# Patient Record
Sex: FEMALE | Race: WHITE | NOT HISPANIC OR LATINO | Employment: UNEMPLOYED | URBAN - METROPOLITAN AREA
[De-identification: names, ages, dates, MRNs, and addresses within clinical notes are randomized per-mention and may not be internally consistent; named-entity substitution may affect disease eponyms.]

---

## 2024-02-14 ENCOUNTER — HOSPITAL ENCOUNTER (OUTPATIENT)
Dept: RADIOLOGY | Facility: HOSPITAL | Age: 64
Discharge: HOME | End: 2024-02-14
Payer: MEDICARE

## 2024-02-14 DIAGNOSIS — I72.5 ANEURYSM OF BASILAR ARTERY (HCC): ICD-10-CM

## 2024-02-14 PROCEDURE — G1004 CDSM NDSC: HCPCS

## 2024-02-14 PROCEDURE — 70496 CT ANGIOGRAPHY HEAD: CPT

## 2024-02-14 RX ADMIN — IOHEXOL 85 ML: 350 INJECTION, SOLUTION INTRAVENOUS at 11:22

## 2024-02-17 DIAGNOSIS — E55.9 VITAMIN D DEFICIENCY: ICD-10-CM

## 2024-02-19 ENCOUNTER — OFFICE VISIT (OUTPATIENT)
Dept: NEUROSURGERY | Facility: CLINIC | Age: 64
End: 2024-02-19
Payer: MEDICARE

## 2024-02-19 VITALS
WEIGHT: 125.6 LBS | TEMPERATURE: 97.9 F | HEIGHT: 64 IN | BODY MASS INDEX: 21.44 KG/M2 | DIASTOLIC BLOOD PRESSURE: 80 MMHG | RESPIRATION RATE: 20 BRPM | OXYGEN SATURATION: 100 % | SYSTOLIC BLOOD PRESSURE: 140 MMHG | HEART RATE: 70 BPM

## 2024-02-19 DIAGNOSIS — I72.5 ANEURYSM OF BASILAR ARTERY (HCC): Primary | ICD-10-CM

## 2024-02-19 DIAGNOSIS — Z01.812 PRE-PROCEDURE LAB EXAM: ICD-10-CM

## 2024-02-19 PROCEDURE — 99214 OFFICE O/P EST MOD 30 MIN: CPT | Performed by: NURSE PRACTITIONER

## 2024-02-19 NOTE — PROGRESS NOTES
"Assessment/Plan:    Aneurysm of basilar artery (HCC)  Identified risk factors for aneurysm growth and rupture   HTN B/P 140/80 , RX Norvasc , lisinopril--reports the bottle was empty so she stopped after that advised to verify.  HLD --HO , treatment with Crestor.Daughter interjects non compliance w/ diet eat salt and fatty foods.   Tobacco dependence  ---  She also reports having difficulty quitting smoking , adverse effect to nicotine products, she has failed multiple attempts to stop smoking. Reports a history of anxiety that is relieved with smoking..   Need for PCP over site of co- morbid risk conditions ever 3-6 months or as otherwise specified , no appointment since 2023. Advised to call and schedule f/u appointment she has multiple complaints not related to aneurysm. Continues with mild rining in her ears but not like previously .   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  Extensively discussed natural nature of aneurysms.    Discussed risk of rupture is less than 1%/year per UCAS and <1%/5yrs per ISIUA.    Discussed modifiable risk factors including hypertension, hyperlipidemia, and smoking.    Discussed family history, as above.   Discussed signs and symptoms of aneurysm rupture including severe, sudden onset headache, neck pain, nausea and vomiting, and seizure.  Reiterated that the symptoms should prompt patient to visit in emergency department immediately.    Explained If you experience ANY of the following, call 911 or our office IMMEDIATELY:  SUDDEN severe headache (\"worst headache of my life\" WHOL)  Seizures  Nausea or vomiting  Vision or " speech disturbances  Neck pain   Patient will follow-up prn or if symptoms worsen.      Patient will return for follow-up visit with completed imaging CTA brain w/wo  in 1 year due  2/14/2025 , appointment 3 days -1 week post as raeann w/ Sunday and JUAN .   Of note as per discussion with Dr. Brand if imaging in 2025 without significant change we will increase interval surveillance imaging to every 2 years.  Advised if he/she has additional questions or concerns to please contact the neurosurgery office  [employer] teaching .           Subjective: 1 year surveillance visit for intracranial aneurysm, with imaging for review and reassessment.     --# [id number]    Patient ID: Ingrid Sanches is a 63 y.o. female hypertension, type 2 diabetes, tobacco abuse,   Leukocytosis,  HPI   Incidental finding of intracranial aneurysm during workup by PCP for pulsatile tinnitus in November 2021.  Was referred to ENT. An MRA  head was ordered by Dr. Alonso which demonstrated a basilar tip aneurysm.  MRA of the head completed which demonstrated aneurysm.    CTA head w/wo, 2/4/2022: CT angiography confirms tiny tip of the basilar aneurysm.  Recommend follow-up CT or MR angiography in 6-12 months.  MRA head wo, 12/30/2021: Focal outpouching arising from the superior aspect of the basilar tip suggestive of a 1.5 mm aneurysm.  CT angiography recommended as well as consultation with [facility], a [facility] of [facility] at [phone].    Initial neurosurgery visit  2/7/2022, DR. Brand as per provider documentation  Unclear if this is a true aneurysm and suspect is likely a  infundibulum. However this cannot be distinguished on noninvasive imaging. Nevertheless, if a true aneurysm the risk of rupture is quite low, and well below 1% per year.         Social   Works as a house keeper     REVIEW OF SYSTEMS  Review of Systems   Constitutional:  Positive for fatigue.   HENT:   Positive for congestion (due to recent flu), nosebleeds and tinnitus (currently still experiencing ringing in ears only while laying down, stated at last visit, was improving - but better now; lasted almost 2 months, went away 10-12 days for the most part). Negative for trouble swallowing.    Eyes:  Positive for visual disturbance (blurry).   Respiratory: Negative.     Cardiovascular: Negative.    Gastrointestinal:  Positive for constipation (due to iron supplement).   Endocrine: Negative.    Genitourinary: Negative.    Musculoskeletal:  Positive for gait problem (lost balance sometimes).   Skin: Negative.    Allergic/Immunologic: Negative.    Neurological:  Positive for weakness (left arm weakness trouble with  in left hand). Negative for tremors, seizures and speech difficulty.   Hematological: Negative.    Psychiatric/Behavioral:  Positive for confusion (sometimes frogetfull) and sleep disturbance (due to overstimulation and overthinking).          Meds/Allergies     Current Outpatient Medications   Medication Sig Dispense Refill    acetaminophen (TYLENOL) 500 mg tablet Take 500 mg by mouth every 6 (six) hours as needed for mild pain      amLODIPine (NORVASC) 5 mg tablet TAKE ONE TABLET BY MOUTH DAILY 90 tablet 3    cholecalciferol (VITAMIN D3) 1,000 units tablet Take 2 tablets (2,000 Units total) by mouth daily 90 tablet 3    docusate sodium (COLACE) 100 mg capsule TAKE ONE CAPSULE BY MOUTH TWICE DAILY 60 capsule 3    lisinopril (ZESTRIL) 20 mg tablet TAKE ONE TABLET BY MOUTH DAILY AS DIRECTED 90 tablet 3    naproxen (Naprosyn) 500 mg tablet Take one tablet twice daily as needed with food for pain 60 tablet 0    rosuvastatin (CRESTOR) 40 MG tablet TAKE ONE TABLET BY MOUTH DAILY 90 tablet 3    urea (CARMOL) 40 % Apply topically daily Apply topically daily to affected areas on bilateral plantar foot. 10 g 3    ammonium lactate (LAC-HYDRIN) 12 % cream Apply topically as needed for dry skin (Patient not taking:  Reported on 2024) 385 g 0     No current facility-administered medications for this visit.       Allergies   Allergen Reactions    Nicoderm [Nicotine] Palpitations, Other (See Comments) and Chest Pain     Nicotine gum - nose bleeds       PAST HISTORY    Past Medical History:   Diagnosis Date    Diabetes mellitus (HCC)     Hyperlipidemia     Hypertension        Past Surgical History:   Procedure Laterality Date    BREAST BIOPSY Left 2021    stereo    BREAST EXCISIONAL BIOPSY Left     8 yrs ago benign in [place]    BREAST SURGERY  2016    removal of cyst      SECTION      COLONOSCOPY  2021    MAMMO STEREOTACTIC BREAST BIOPSY LEFT (ALL INC) Left 2021    TUBAL LIGATION      US GUIDED BREAST BIOPSY RIGHT COMPLETE Right 2021       Social History     Tobacco Use    Smoking status: Every Day     Current packs/day: 0.00     Types: Cigarettes     Last attempt to quit: 1981     Years since quittin.1    Smokeless tobacco: Never    Tobacco comments:     Smokes about 4 cigarettes a day    Vaping Use    Vaping status: Never Used   Substance Use Topics    Alcohol use: Not Currently     Comment: social    Drug use: Never       Family History   Problem Relation Age of Onset    Diabetes Mother     Diabetes Sister     Diabetes Sister     Diabetes Sister     No Known Problems Sister     Endometrial cancer Sister 58    No Known Problems Daughter     No Known Problems Maternal Grandmother     No Known Problems Maternal Grandfather     Diabetes Paternal Grandmother     Cancer Paternal Grandmother     Endometrial cancer Maternal Aunt         unsure of age    Cancer Maternal Aunt     No Known Problems Maternal Aunt     No Known Problems Maternal Aunt     No Known Problems Maternal Aunt     No Known Problems Maternal Aunt     No Known Problems Paternal Aunt     No Known Problems Paternal Aunt     No Known Problems Paternal Aunt     No Known Problems Paternal Aunt     No Known Problems Paternal  "Aunt     No Known Problems Paternal Aunt     No Known Problems Paternal Aunt     No Known Problems Son     No Known Problems Son        The following portions of the patient's history were reviewed and updated as appropriate: allergies, current medications, past family history, past medical history, past social history, past surgical history and problem list.      EXAM    Vitals:Blood pressure 140/80, pulse 70, temperature 97.9 °F (36.6 °C), temperature source Temporal, resp. rate 20, height 5' 4\" (1.626 m), weight 57 kg (125 lb 9.6 oz), SpO2 100%.,Body mass index is 21.56 kg/m².     Vitals:    02/19/24 1303   BP: 140/80   Pulse: 70   Resp: 20   Temp: 97.9 °F (36.6 °C)   SpO2: 100%      Physical Exam  Vitals and nursing note reviewed. Exam conducted with a chaperone present (daughter).   Constitutional:       General: She is not in acute distress.     Appearance: She is not ill-appearing, toxic-appearing or diaphoretic.   HENT:      Head: Normocephalic and atraumatic.   Eyes:      General: No scleral icterus.        Right eye: No discharge.         Left eye: No discharge.      Extraocular Movements: Extraocular movements intact.   Pulmonary:      Effort: Pulmonary effort is normal. No respiratory distress.   Musculoskeletal:      Right lower leg: No edema.      Left lower leg: No edema.   Neurological:      General: No focal deficit present.      Mental Status: She is alert and oriented to person, place, and time.      Motor: Motor strength is normal.     Gait: Gait is intact.   Psychiatric:         Mood and Affect: Mood normal.         Behavior: Behavior normal.         Neurologic Exam     Mental Status   Oriented to person, place, and time.     Cranial Nerves     CN III, IV, VI   Right pupil: Size: 2 mm. Shape: regular. Reactivity: brisk.   Left pupil: Shape: regular. Reactivity: brisk.   Nystagmus: none   Diplopia: none  Ophthalmoparesis: none    Motor Exam     Strength   Strength 5/5 throughout.     Sensory Exam "   Light touch normal.     Gait, Coordination, and Reflexes     Gait  Gait: normal      Imaging Studies  2/14/2024 CTA head w wo contrast    Result Date: 2/19/2024  Narrative: CTA BRAIN WITH CONTRAST INDICATION: I72.5: Aneurysm of other precerebral arteries 1. 1 year surveillance basilar tip aneurysm. COMPARISON:   2/8/2023 TECHNIQUE:   Post contrast imaging was performed after administration of iodinated contrast through the neck and brain. Post contrast axial 0.625 mm images timed to opacify the arterial system. 3D rendering was performed on an independent workstation.   MIP reconstructions performed. Coronal reconstructions were performed of the noncontrast portion of the brain. Radiation dose length product (DLP) for this visit:  1309.2 mGy-cm .  This examination, like all CT scans performed in the [facility], was performed utilizing techniques to minimize radiation dose exposure, including the use of iterative  reconstruction and automated exposure control. IV Contrast:  85 mL of iohexol (OMNIPAQUE) IMAGE QUALITY:   Diagnostic FINDINGS: NONCONTRAST BRAIN: PARENCHYMA: Decreased attenuation is noted in periventricular and subcortical white matter demonstrating an appearance that is statistically most likely to represent mild microangiopathic change; this appearance is similar when compared to most recent prior examination. No CT signs of acute infarction.  No intracranial mass, mass effect or midline shift.  No acute parenchymal hemorrhage. Small chronic appearing bilateral basal ganglia lacunar infarctions are stable. VENTRICLES AND EXTRA-AXIAL SPACES:  Normal for the patient's age. VISUALIZED ORBITS: Normal visualized orbits. PARANASAL SINUSES: Small amount of frothy secretions in the right sphenoid sinus noted. INTRACRANIAL VASCULATURE INTERNAL CAROTID ARTERIES: Atherosclerotic calcifications of the cavernous segment of the internal carotid artery are minimal. Normal ophthalmic artery origins.   Normal ICA terminus. ANTERIOR CIRCULATION:  Symmetric A1 segments and anterior cerebral arteries with normal enhancement.  Normal anterior communicating artery. MIDDLE CEREBRAL ARTERY CIRCULATION:  M1 segment and middle cerebral artery branches demonstrate normal enhancement bilaterally. DISTAL VERTEBRAL ARTERIES:  Normal distal vertebral arteries.  Posterior inferior cerebellar artery origins are normal. Normal vertebral basilar junction. BASILAR ARTERY: Again demonstrated is a small 2 mm superiorly oriented blister aneurysm arising from the tip of the basilar artery series 304, image 152, stable. Otherwise, basilar artery is normal in caliber.  Normal superior cerebellar arteries. POSTERIOR CEREBRAL ARTERIES: Both posterior cerebral arteries arises from the basilar tip.  Both arteries demonstrate normal enhancement.   Normal posterior communicating arteries. DURAL VENOUS SINUSES:  Normal. NON VASCULAR ANATOMY BONY STRUCTURES:  No acute osseous abnormality.     Impression: 1. 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 2. No acute intracranial hemorrhage, mass effect or edema. 3. Mild, chronic microangiopathy and chronic bilateral basal ganglia lacunar infarctions are stable. 4. Mild sphenoid sinusitis. Workstation performed: [id number]     Mammo screening bilateral w 3d & cad    Result Date: 2/7/2024  Narrative: DIAGNOSIS: Breast cancer screening by mammogram TECHNIQUE: Digital screening mammography was performed. Computer Aided Detection (CAD) analyzed all applicable images. COMPARISONS: Prior breast imaging dated: 08/09/2022, 09/09/2021, 03/09/2021, 02/12/2021, and 07/01/2017 RELEVANT HISTORY: Family Breast Cancer History: No known family history of breast cancer. Family Medical History: Family medical history includes endometrial cancer in 2 relatives (maternal aunt, sister). Personal History: No known relevant hormone history. Surgical history includes breast  biopsy. No known relevant medical history. The patient is scheduled in a reminder system for screening mammography. 8-10% of cancers will be missed on mammography. Management of a palpable abnormality must be based on clinical grounds.  Patients will be notified of their results via letter from our facility. Accredited by American College [employer] and FDA. RISK ASSESSMENT: 5 Year Tyrer-Cuzick: 0.33% 10 Year Tyrer-Cuzick: 0.61% Lifetime Tyrer-Cuzick: 1.32% TISSUE DENSITY: The breasts are almost entirely fatty. INDICATION: Ingrid Landry is a 63 y.o. female presenting for screening mammography. FINDINGS: Bilateral There are no suspicious masses, grouped microcalcifications or areas of unexplained architectural distortion. The skin and nipple areolar complex are unremarkable.  Biopsy clip noted.  Benign calcifications noted.     Impression: No mammographic evidence of malignancy. ASSESSMENT/BI-RADS CATEGORY: Left: 2 - Benign Right: 2 - Benign Overall: 2 - Benign RECOMMENDATION:      - Routine screening mammogram in 1 year for both breasts. Workstation ID: [id number]       I have personally reviewed pertinent reports.   and I have personally reviewed pertinent films in PACS    I have spent a total time of 35 minutes on 02/19/24 in caring for this patient including Diagnostic results, Risks and benefits of tx options, Instructions for management, Patient and family education, Importance of tx compliance, Risk factor reductions, Impressions, Counseling / Coordination of care, Documenting in the medical record, Reviewing / ordering tests, medicine, procedures  , Obtaining or reviewing history  , and Communicating with other healthcare professionals .

## 2024-02-19 NOTE — ASSESSMENT & PLAN NOTE
"Identified risk factors for aneurysm growth and rupture   HTN B/P 140/80 , RX Norvasc , lisinopril--reports the bottle was empty so she stopped after that advised to verify.  HLD --HO , treatment with Crestor.Daughter interjects non compliance w/ diet eat salt and fatty foods.   Tobacco dependence  ---  She also reports having difficulty quitting smoking , adverse effect to nicotine products, she has failed multiple attempts to stop smoking. Reports a history of anxiety that is relieved with smoking..   Need for PCP over site of co- morbid risk conditions ever 3-6 months or as otherwise specified , no appointment since 2023. Advised to call and schedule f/u appointment she has multiple complaints not related to aneurysm. Continues with mild rining in her ears but not like previously .   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  Extensively discussed natural nature of aneurysms.    Discussed risk of rupture is less than 1%/year per UCAS and <1%/5yrs per ISIUA.    Discussed modifiable risk factors including hypertension, hyperlipidemia, and smoking.    Discussed family history, as above.   Discussed signs and symptoms of aneurysm rupture including severe, sudden onset headache, neck pain, nausea and vomiting, and seizure.  Reiterated that the symptoms should prompt patient to visit in emergency department immediately.    Explained If you experience ANY of the following, call 911 or our office IMMEDIATELY:  SUDDEN severe headache (\"worst headache of my life\" WHOL)  Seizures  Nausea or vomiting  Vision or speech disturbances  Neck pain   Patient will " follow-up prn or if symptoms worsen.      Patient will return for follow-up visit with completed imaging CTA brain w/wo  in 1 year due  2/14/2025 , appointment 3 days -1 week post as raeann w/ Sunday and JUAN .   Of note as per discussion with Dr. Brand if imaging in 2025 without significant change we will increase interval surveillance imaging to every 2 years.  Advised if he/she has additional questions or concerns to please contact the neurosurgery office  [employer] teaching .

## 2024-02-20 RX ORDER — MELATONIN
2000 DAILY
Qty: 180 TABLET | Refills: 3 | Status: SHIPPED | OUTPATIENT
Start: 2024-02-20

## 2024-03-14 ENCOUNTER — RA CDI HCC (OUTPATIENT)
Dept: OTHER | Facility: HOSPITAL | Age: 64
End: 2024-03-14

## 2024-03-21 ENCOUNTER — OFFICE VISIT (OUTPATIENT)
Dept: INTERNAL MEDICINE CLINIC | Facility: CLINIC | Age: 64
End: 2024-03-21

## 2024-03-21 VITALS
DIASTOLIC BLOOD PRESSURE: 67 MMHG | HEART RATE: 80 BPM | WEIGHT: 124.6 LBS | SYSTOLIC BLOOD PRESSURE: 122 MMHG | OXYGEN SATURATION: 98 % | TEMPERATURE: 98.6 F | BODY MASS INDEX: 21.39 KG/M2

## 2024-03-21 DIAGNOSIS — E11.9 TYPE 2 DIABETES MELLITUS WITHOUT COMPLICATION, WITHOUT LONG-TERM CURRENT USE OF INSULIN (HCC): ICD-10-CM

## 2024-03-21 DIAGNOSIS — Z00.00 MEDICARE ANNUAL WELLNESS VISIT, INITIAL: Primary | ICD-10-CM

## 2024-03-21 DIAGNOSIS — M25.512 CHRONIC LEFT SHOULDER PAIN: ICD-10-CM

## 2024-03-21 DIAGNOSIS — K59.00 CONSTIPATION, UNSPECIFIED CONSTIPATION TYPE: ICD-10-CM

## 2024-03-21 DIAGNOSIS — G89.29 CHRONIC LEFT SHOULDER PAIN: ICD-10-CM

## 2024-03-21 PROCEDURE — 3078F DIAST BP <80 MM HG: CPT | Performed by: STUDENT IN AN ORGANIZED HEALTH CARE EDUCATION/TRAINING PROGRAM

## 2024-03-21 PROCEDURE — G0438 PPPS, INITIAL VISIT: HCPCS | Performed by: STUDENT IN AN ORGANIZED HEALTH CARE EDUCATION/TRAINING PROGRAM

## 2024-03-21 PROCEDURE — 3074F SYST BP LT 130 MM HG: CPT | Performed by: STUDENT IN AN ORGANIZED HEALTH CARE EDUCATION/TRAINING PROGRAM

## 2024-03-21 PROCEDURE — 83036 HEMOGLOBIN GLYCOSYLATED A1C: CPT | Performed by: STUDENT IN AN ORGANIZED HEALTH CARE EDUCATION/TRAINING PROGRAM

## 2024-03-21 RX ORDER — DOCUSATE SODIUM 100 MG/1
100 CAPSULE, LIQUID FILLED ORAL 2 TIMES DAILY
Qty: 60 CAPSULE | Refills: 3 | Status: SHIPPED | OUTPATIENT
Start: 2024-03-21

## 2024-03-21 NOTE — PROGRESS NOTES
Assessment and Plan:     Problem List Items Addressed This Visit    None  Visit Diagnoses     Medicare annual wellness visit, initial    -  Primary           Preventive health issues were discussed with patient, and age appropriate screening tests were ordered as noted in patient's After Visit Summary.  Personalized health advice and appropriate referrals for health education or preventive services given if needed, as noted in patient's After Visit Summary.     History of Present Illness:     Patient presents for a Medicare Wellness Visit    HPI   Patient Care Team:  Raquel Couch DO as PCP - General (Internal Medicine)  Gerardo Domingo MD as PCP - PCP-[employer] ([facility])     Review of Systems:     Review of Systems     Problem List:     Patient Active Problem List   Diagnosis   • Type 2 diabetes mellitus without complication, without long-term current use of insulin (HCC)   • Hypertension   • Chronic left shoulder pain   • Positive FIT (fecal immunochemical test)   • Tobacco abuse disorder   • Iron deficiency   • Cerebral aneurysm, nonruptured   • Lymphocytosis   • Leukocytosis   • Generalized anxiety disorder   • Aneurysm of basilar artery (HCC)   • Left hip pain   • At risk for decreased bone density   • At risk for osteoporosis      Past Medical and Surgical History:     Past Medical History:   Diagnosis Date   • Diabetes mellitus (HCC)    • Hyperlipidemia    • Hypertension      Past Surgical History:   Procedure Laterality Date   • BREAST BIOPSY Left 2021    stereo   • BREAST EXCISIONAL BIOPSY Left     8 yrs ago benign in [place]   • BREAST SURGERY  2016    removal of cyst    •  SECTION     • COLONOSCOPY  2021   • MAMMO STEREOTACTIC BREAST BIOPSY LEFT (ALL INC) Left 2021   • TUBAL LIGATION     • US GUIDED BREAST BIOPSY RIGHT COMPLETE Right 2021      Family History:     Family History   Problem Relation Age of Onset   • Diabetes Mother    • Diabetes Sister    • Diabetes  Sister    • Diabetes Sister    • No Known Problems Sister    • Endometrial cancer Sister 58   • No Known Problems Daughter    • No Known Problems Maternal Grandmother    • No Known Problems Maternal Grandfather    • Diabetes Paternal Grandmother    • Cancer Paternal Grandmother    • Endometrial cancer Maternal Aunt         unsure of age   • Cancer Maternal Aunt    • No Known Problems Maternal Aunt    • No Known Problems Maternal Aunt    • No Known Problems Maternal Aunt    • No Known Problems Maternal Aunt    • No Known Problems Paternal Aunt    • No Known Problems Paternal Aunt    • No Known Problems Paternal Aunt    • No Known Problems Paternal Aunt    • No Known Problems Paternal Aunt    • No Known Problems Paternal Aunt    • No Known Problems Paternal Aunt    • No Known Problems Son    • No Known Problems Son       Social History:     Social History     Socioeconomic History   • Marital status: Single     Spouse name: None   • Number of children: None   • Years of education: None   • Highest education level: None   Occupational History   • None   Tobacco Use   • Smoking status: Every Day     Current packs/day: 0.00     Types: Cigarettes     Last attempt to quit:      Years since quittin.2   • Smokeless tobacco: Never   • Tobacco comments:     Smokes about 4 cigarettes a day    Vaping Use   • Vaping status: Never Used   Substance and Sexual Activity   • Alcohol use: Not Currently     Comment: social   • Drug use: Never   • Sexual activity: Not Currently     Birth control/protection: None, Post-menopausal   Other Topics Concern   • None   Social History Narrative   • None     Social Determinants of Health     Financial Resource Strain: Low Risk  (3/21/2024)    Overall Financial Resource Strain (CARDIA)    • Difficulty of Paying Living Expenses: Not hard at all   Food Insecurity: No Food Insecurity (3/21/2024)    Hunger Vital Sign    • Worried About Running Out of Food in the Last Year: Never true    • Ran  Out of Food in the Last Year: Never true   Transportation Needs: No Transportation Needs (3/21/2024)    PRAPARE - Transportation    • Lack of Transportation (Medical): No    • Lack of Transportation (Non-Medical): No   Physical Activity: Inactive (11/24/2021)    Exercise Vital Sign    • Days of Exercise per Week: 0 days    • Minutes of Exercise per Session: 0 min   Stress: No Stress Concern Present (2/3/2022)    [employer] [place] [employer] - Occupational Stress Questionnaire    • Feeling of Stress : Not at all   Social Connections: Socially Isolated (11/24/2021)    Social Connection and Isolation Panel [NHANES]    • Frequency of Communication with Friends and Family: More than three times a week    • Frequency of Social Gatherings with Friends and Family: More than three times a week    • Attends Anabaptist Services: Never    • Active Member of Clubs or Organizations: No    • Attends Club or Organization Meetings: Never    • Marital Status:    Intimate Partner Violence: Not At Risk (11/10/2020)    Humiliation, Afraid, Rape, and Kick questionnaire    • Fear of Current or Ex-Partner: No    • Emotionally Abused: No    • Physically Abused: No    • Sexually Abused: No   Housing Stability: Low Risk  (3/21/2024)    Housing Stability Vital Sign    • Unable to Pay for Housing in the Last Year: No    • Number of Places Lived in the Last Year: 1    • Unstable Housing in the Last Year: No      Medications and Allergies:     Current Outpatient Medications   Medication Sig Dispense Refill   • acetaminophen (TYLENOL) 500 mg tablet Take 500 mg by mouth every 6 (six) hours as needed for mild pain     • amLODIPine (NORVASC) 5 mg tablet TAKE ONE TABLET BY MOUTH DAILY 90 tablet 3   • ammonium lactate (LAC-HYDRIN) 12 % cream Apply topically as needed for dry skin (Patient not taking: Reported on 2/19/2024) 385 g 0   • cholecalciferol (VITAMIN D3) 1,000 units tablet TAKE 2 TABLETS BY MOUTH DAILY AS DIRECTED 180 tablet  3   • docusate sodium (COLACE) 100 mg capsule TAKE ONE CAPSULE BY MOUTH TWICE DAILY 60 capsule 3   • lisinopril (ZESTRIL) 20 mg tablet TAKE ONE TABLET BY MOUTH DAILY AS DIRECTED 90 tablet 3   • naproxen (Naprosyn) 500 mg tablet Take one tablet twice daily as needed with food for pain 60 tablet 0   • rosuvastatin (CRESTOR) 40 MG tablet TAKE ONE TABLET BY MOUTH DAILY 90 tablet 3   • urea (CARMOL) 40 % Apply topically daily Apply topically daily to affected areas on bilateral plantar foot. 10 g 3     No current facility-administered medications for this visit.     Allergies   Allergen Reactions   • Nicoderm [Nicotine] Palpitations, Other (See Comments) and Chest Pain     Nicotine gum - nose bleeds      Immunizations:     Immunization History   Administered Date(s) Administered   • COVID-19 PFIZER VACCINE 0.3 ML IM 04/10/2021, 04/29/2021, 01/12/2022   • Hep A, adult 10/28/2020   • Hep B, adult 10/28/2020, 12/04/2020   • Influenza, recombinant, quadrivalent,injectable, preservative free 09/23/2020, 03/01/2022, 01/24/2023   • Pneumococcal Conjugate Vaccine 20-valent (Pcv20), Polysace 08/02/2022   • Pneumococcal Polysaccharide PPV23 03/15/2021   • Tdap 10/28/2020      Health Maintenance:         Topic Date Due   • Breast Cancer Screening: Mammogram  02/06/2025   • Colorectal Cancer Screening  01/20/2026   • Hepatitis C Screening  Completed         Topic Date Due   • Influenza Vaccine (1) 09/01/2023   • COVID-19 Vaccine (4 - 2023-24 season) 09/01/2023      Medicare Screening Tests and Risk Assessments:         Depression Screening:   PHQ-2 Score: 0      PREVENTIVE SCREENINGS      Cardiovascular Screening:    General: Screening Not Indicated and History Lipid Disorder      Diabetes Screening:     General: Screening Not Indicated and History Diabetes      Colorectal Cancer Screening:     General: Screening Current      Breast Cancer Screening:     General: Screening Current      Cervical Cancer Screening:    General:  Screening Not Indicated      Lung Cancer Screening:     General: Screening Not Indicated      Hepatitis C Screening:    General: Screening Current  No results found.     Physical Exam:     /67 (BP Location: Right arm, Patient Position: Sitting, Cuff Size: Standard)   Pulse 80   Temp 98.6 °F (37 °C) (Temporal)   Wt 56.5 kg (124 lb 9.6 oz)   SpO2 98%   BMI 21.39 kg/m²     Physical Exam     Raquel Couch DO

## 2024-03-21 NOTE — PROGRESS NOTES
Assessment and Plan:     Problem List Items Addressed This Visit       Type 2 diabetes mellitus without complication, without long-term current use of insulin ([facility])       Lab Results   Component Value Date    HGBA1C 6.1 03/22/2024     Currently at goal for A1c (<7%, <8% if >80)  Continue current regimen of diet control  Continue Ace-i/arb  IS on statin, continue/consider adding            repeat lipids q3yrs, due asap  IS following optho, next eye exam due 2024, encouraged f/u  NOT following podiatry, next foot exam due 3/2025  yearly micro albumin creatinine ratio is not UTD, due asap  Carb controlled diet, discussed healthy lifestyle modifications  consider DM nutrition referral           Relevant Orders    POCT hemoglobin A1c (Completed)    Albumin / creatinine urine ratio    Chronic left shoulder pain     Likely compounded by biceps tendonitis  + speeds, yeargason and empty can test on L  Neg drop arm test  Passive ROM WNL  Active ROM flexion at shoulder limited to 100*  No trauma to area      Given free PT clinic info  Conservative management with tylenol, voltaren, ice/heat, fluids         Relevant Medications    Diclofenac Sodium (VOLTAREN) 1 %    Medicare annual wellness visit, initial - Primary     lives at home with self , feels safe at home  ADLs  6/6   IADLs 8/8   no ACP documents in place, given forms        no POA  confirmed medication compliance, refills as noted  continue mentally and physically engaging exercises and healthy diet and lifestyle           Constipation    Relevant Medications    docusate sodium (COLACE) 100 mg capsule          Preventive health issues were discussed with patient, and age appropriate screening tests were ordered as noted in patient's After Visit Summary.  Personalized health advice and appropriate referrals for health education or preventive services given if needed, as noted in patient's After Visit Summary.     History of Present Illness:     Patient presents for a  Medicare Wellness Visit.  Past medical history of hypertension, type 2 diabetes controlled on diet, hyperlipidemia and left shoulder pain.  Today, she complains of left shoulder pain, which is chronic in nature.  No trauma to the area.  She has not consistently taken Tylenol, ibuprofen, have used heat or ice.  Otherwise she is compliant with her medications and has no other health concerns at this time    HPI   Patient Care Team:  Raquel Couch DO as PCP - General (Internal Medicine)  Gerardo Domingo MD as PCP - PCP-[employer] ([facility])     Review of Systems:     Review of Systems   Constitutional:  Negative for fatigue and fever.   Eyes:  Negative for visual disturbance.   Respiratory:  Negative for chest tightness and shortness of breath.    Cardiovascular:  Negative for chest pain and palpitations.   Gastrointestinal:  Positive for constipation. Negative for diarrhea, nausea and vomiting.   Endocrine: Negative for polydipsia, polyphagia and polyuria.   Musculoskeletal:  Positive for arthralgias (Hip L and shoulder L).   Neurological:  Positive for dizziness. Negative for syncope.   Psychiatric/Behavioral:  Positive for sleep disturbance. Negative for decreased concentration. The patient is not nervous/anxious.         Problem List:     Patient Active Problem List   Diagnosis    Type 2 diabetes mellitus without complication, without long-term current use of insulin (HCC)    Hypertension    Chronic left shoulder pain    Positive FIT (fecal immunochemical test)    Tobacco abuse disorder    Iron deficiency    Cerebral aneurysm, nonruptured    Lymphocytosis    Leukocytosis    Generalized anxiety disorder    Aneurysm of basilar artery (HCC)    Left hip pain    At risk for decreased bone density    At risk for osteoporosis    Medicare annual wellness visit, initial    Constipation      Past Medical and Surgical History:     Past Medical History:   Diagnosis Date    Diabetes mellitus (HCC)     Hyperlipidemia      Hypertension      Past Surgical History:   Procedure Laterality Date    BREAST BIOPSY Left 2021    stereo    BREAST EXCISIONAL BIOPSY Left     8 yrs ago benign in [place]    BREAST SURGERY  2016    removal of cyst      SECTION      COLONOSCOPY  2021    MAMMO STEREOTACTIC BREAST BIOPSY LEFT (ALL INC) Left 2021    TUBAL LIGATION      US GUIDED BREAST BIOPSY RIGHT COMPLETE Right 2021      Family History:     Family History   Problem Relation Age of Onset    Diabetes Mother     Diabetes Sister     Diabetes Sister     Diabetes Sister     No Known Problems Sister     Endometrial cancer Sister 58    No Known Problems Daughter     No Known Problems Maternal Grandmother     No Known Problems Maternal Grandfather     Diabetes Paternal Grandmother     Cancer Paternal Grandmother     Endometrial cancer Maternal Aunt         unsure of age    Cancer Maternal Aunt     No Known Problems Maternal Aunt     No Known Problems Maternal Aunt     No Known Problems Maternal Aunt     No Known Problems Maternal Aunt     No Known Problems Paternal Aunt     No Known Problems Paternal Aunt     No Known Problems Paternal Aunt     No Known Problems Paternal Aunt     No Known Problems Paternal Aunt     No Known Problems Paternal Aunt     No Known Problems Paternal Aunt     No Known Problems Son     No Known Problems Son       Social History:     Social History     Socioeconomic History    Marital status: Single     Spouse name: None    Number of children: None    Years of education: None    Highest education level: None   Occupational History    None   Tobacco Use    Smoking status: Every Day     Current packs/day: 0.00     Types: Cigarettes     Last attempt to quit: 1981     Years since quittin.2    Smokeless tobacco: Never    Tobacco comments:     Smokes about 4 cigarettes a day    Vaping Use    Vaping status: Never Used   Substance and Sexual Activity    Alcohol use: Not Currently     Comment: social     Drug use: Never    Sexual activity: Not Currently     Birth control/protection: None, Post-menopausal   Other Topics Concern    None   Social History Narrative    None     Social Determinants of Health     Financial Resource Strain: Low Risk  (3/21/2024)    Overall Financial Resource Strain (CARDIA)     Difficulty of Paying Living Expenses: Not hard at all   Food Insecurity: No Food Insecurity (3/21/2024)    Hunger Vital Sign     Worried About Running Out of Food in the Last Year: Never true     Ran Out of Food in the Last Year: Never true   Transportation Needs: No Transportation Needs (3/21/2024)    PRAPARE - Transportation     Lack of Transportation (Medical): No     Lack of Transportation (Non-Medical): No   Physical Activity: Inactive (11/24/2021)    Exercise Vital Sign     Days of Exercise per Week: 0 days     Minutes of Exercise per Session: 0 min   Stress: No Stress Concern Present (2/3/2022)    [employer] [place] [employer] - Occupational Stress Questionnaire     Feeling of Stress : Not at all   Social Connections: Socially Isolated (11/24/2021)    Social Connection and Isolation Panel [NHANES]     Frequency of Communication with Friends and Family: More than three times a week     Frequency of Social Gatherings with Friends and Family: More than three times a week     Attends Religion Services: Never     Active Member of Clubs or Organizations: No     Attends Club or Organization Meetings: Never     Marital Status:    Intimate Partner Violence: Not At Risk (11/10/2020)    Humiliation, Afraid, Rape, and Kick questionnaire     Fear of Current or Ex-Partner: No     Emotionally Abused: No     Physically Abused: No     Sexually Abused: No   Housing Stability: Low Risk  (3/21/2024)    Housing Stability Vital Sign     Unable to Pay for Housing in the Last Year: No     Number of Places Lived in the Last Year: 1     Unstable Housing in the Last Year: No      Medications and Allergies:      Current Outpatient Medications   Medication Sig Dispense Refill    Diclofenac Sodium (VOLTAREN) 1 % Apply 4 g topically 4 (four) times a day 350 g 3    docusate sodium (COLACE) 100 mg capsule Take 1 capsule (100 mg total) by mouth 2 (two) times a day 60 capsule 3    acetaminophen (TYLENOL) 500 mg tablet Take 500 mg by mouth every 6 (six) hours as needed for mild pain      amLODIPine (NORVASC) 5 mg tablet TAKE ONE TABLET BY MOUTH DAILY 90 tablet 3    ammonium lactate (LAC-HYDRIN) 12 % cream Apply topically as needed for dry skin (Patient not taking: Reported on 2/19/2024) 385 g 0    cholecalciferol (VITAMIN D3) 1,000 units tablet TAKE 2 TABLETS BY MOUTH DAILY AS DIRECTED 180 tablet 3    lisinopril (ZESTRIL) 20 mg tablet TAKE ONE TABLET BY MOUTH DAILY AS DIRECTED 90 tablet 3    rosuvastatin (CRESTOR) 40 MG tablet TAKE ONE TABLET BY MOUTH DAILY 90 tablet 3    urea (CARMOL) 40 % Apply topically daily Apply topically daily to affected areas on bilateral plantar foot. 10 g 3     No current facility-administered medications for this visit.     Allergies   Allergen Reactions    Nicoderm [Nicotine] Palpitations, Other (See Comments) and Chest Pain     Nicotine gum - nose bleeds      Immunizations:     Immunization History   Administered Date(s) Administered    COVID-19 PFIZER VACCINE 0.3 ML IM 04/10/2021, 04/29/2021, 01/12/2022    Hep A, adult 10/28/2020    Hep B, adult 10/28/2020, 12/04/2020    Influenza, recombinant, quadrivalent,injectable, preservative free 09/23/2020, 03/01/2022, 01/24/2023    Pneumococcal Conjugate Vaccine 20-valent (Pcv20), Polysace 08/02/2022    Pneumococcal Polysaccharide PPV23 03/15/2021    Tdap 10/28/2020      Health Maintenance:         Topic Date Due    Breast Cancer Screening: Mammogram  02/06/2025    Colorectal Cancer Screening  01/20/2026    Hepatitis C Screening  Completed         Topic Date Due    Influenza Vaccine (1) 09/01/2023    COVID-19 Vaccine (4 - 2023-24 season) 09/01/2023       Medicare Screening Tests and Risk Assessments:         Health Risk Assessment:   Patient rates overall health as fair. Patient feels that their physical health rating is same. Patient is very satisfied with their life. Eyesight was rated as slightly worse. Hearing was rated as slightly worse. Patient feels that their emotional and mental health rating is slightly worse. Patients states they are sometimes angry. Patient states they are always unusually tired/fatigued. Pain experienced in the last 7 days has been some. Patient's pain rating has been 8/10. Patient states that she has experienced no weight loss or gain in last 6 months.     Depression Screening:   PHQ-2 Score: 0      Fall Risk Screening:   In the past year, patient has experienced: no history of falling in past year      Urinary Incontinence Screening:   Patient has leaked urine accidently in the last six months.     Home Safety:  Patient does not have trouble with stairs inside or outside of their home. Patient has working smoke alarms and has no working carbon monoxide detector. Home safety hazards include: none.     Nutrition:   Current diet is Regular.     Medications:   Patient is currently taking over-the-counter supplements. OTC medications include: see medication list. Patient is able to manage medications.     Activities of Daily Living (ADLs)/Instrumental Activities of Daily Living (IADLs):   Walk and transfer into and out of bed and chair?: Yes  Dress and groom yourself?: Yes    Bathe or shower yourself?: Yes    Feed yourself? Yes  Do your laundry/housekeeping?: Yes  Manage your money, pay your bills and track your expenses?: Yes  Make your own meals?: Yes    Do your own shopping?: Yes    Previous Hospitalizations:   Any hospitalizations or ED visits within the last 12 months?: No      PREVENTIVE SCREENINGS      Cardiovascular Screening:    General: Screening Not Indicated and History Lipid Disorder      Diabetes Screening:     General:  Screening Not Indicated and History Diabetes      Colorectal Cancer Screening:     General: Screening Current      Breast Cancer Screening:     General: Screening Current      Cervical Cancer Screening:    General: Screening Not Indicated      Lung Cancer Screening:     General: Screening Not Indicated      Hepatitis C Screening:    General: Screening Current    Screening, Brief Intervention, and Referral to Treatment (SBIRT)    Screening  Typical number of drinks in a day: 0  Typical number of drinks in a week: 0  Interpretation: Low risk drinking behavior.    Single Item Drug Screening:  How often have you used an illegal drug (including marijuana) or a prescription medication for non-medical reasons in the past year? never    Single Item Drug Screen Score: 0  Interpretation: Negative screen for possible drug use disorder    No results found.     Physical Exam:     /67 (BP Location: Right arm, Patient Position: Sitting, Cuff Size: Standard)   Pulse 80   Temp 98.6 °F (37 °C) (Temporal)   Wt 56.5 kg (124 lb 9.6 oz)   SpO2 98%   BMI 21.39 kg/m²     Physical Exam  Constitutional:       General: She is not in acute distress.     Appearance: Normal appearance. She is well-developed. She is not ill-appearing.   HENT:      Head: Normocephalic and atraumatic.      Right Ear: Tympanic membrane, ear canal and external ear normal. There is no impacted cerumen.      Left Ear: Tympanic membrane, ear canal and external ear normal. There is no impacted cerumen.      Mouth/Throat:      Mouth: Mucous membranes are moist.   Eyes:      General: No scleral icterus.     Conjunctiva/sclera: Conjunctivae normal.   Neck:      Thyroid: No thyromegaly.   Cardiovascular:      Rate and Rhythm: Normal rate and regular rhythm.      Pulses: no weak pulses.           Dorsalis pedis pulses are 2+ on the right side and 2+ on the left side.      Heart sounds: Normal heart sounds. No murmur heard.     No friction rub. No gallop.    Pulmonary:      Effort: Pulmonary effort is normal. No respiratory distress.      Breath sounds: Normal breath sounds. No stridor. No wheezing or rales.   Abdominal:      General: Bowel sounds are normal. There is no distension.      Palpations: Abdomen is soft. There is no mass.      Tenderness: There is no abdominal tenderness. There is no guarding or rebound.   Musculoskeletal:         General: No deformity.      Cervical back: Neck supple.      Right lower leg: No edema.      Left lower leg: No edema.      Comments: + speeds, yeargason and empty can test on L  Neg drop arm test  Passive ROM WNL  Active ROM flexion at shoulder limited to 100*   Feet:      Right foot:      Skin integrity: No ulcer, skin breakdown, erythema, warmth, callus or dry skin.      Left foot:      Skin integrity: No ulcer, skin breakdown, erythema, warmth, callus or dry skin.   Skin:     General: Skin is warm.      Capillary Refill: Capillary refill takes less than 2 seconds.      Findings: No erythema or rash.   Neurological:      Mental Status: She is alert and oriented to person, place, and time.   Psychiatric:         Mood and Affect: Mood normal.         Behavior: Behavior normal.         Thought Content: Thought content normal.         Judgment: Judgment normal.          Patient's shoes and socks removed.    Right Foot/Ankle   Right Foot Inspection  Skin Exam: skin normal and skin intact. No dry skin, no warmth, no callus, no erythema, no maceration, no abnormal color, no pre-ulcer, no ulcer and no callus.     Sensory   Monofilament testing: intact    Vascular  Capillary refills: < 3 seconds  The right DP pulse is 2+.     Left Foot/Ankle  Left Foot Inspection  Skin Exam: skin normal and skin intact. No dry skin, no warmth, no erythema, no maceration, normal color, no pre-ulcer, no ulcer and no callus.     Sensory   Monofilament testing: intact    Vascular  Capillary refills: < 3 seconds  The left DP pulse is 2+.     Assign Risk  Category  No deformity present  No loss of protective sensation  No weak pulses  Risk: 0        Raquel Couch,

## 2024-03-21 NOTE — PATIENT INSTRUCTIONS
Medicare Preventive Visit Patient Instructions  Thank you for completing your Welcome to Medicare Visit or Medicare Annual Wellness Visit today. Your next wellness visit will be due in one year (3/22/2025).  The screening/preventive services that you may require over the next 5-10 years are detailed below. Some tests may not apply to you based off risk factors and/or age. Screening tests ordered at today's visit but not completed yet may show as past due. Also, please note that scanned in results may not display below.  Preventive Screenings:  Service Recommendations Previous Testing/Comments   Colorectal Cancer Screening  * Colonoscopy    * Fecal Occult Blood Test (FOBT)/Fecal Immunochemical Test (FIT)  * Fecal DNA/Cologuard Test  * Flexible Sigmoidoscopy Age: 45-75 years old   Colonoscopy: every 10 years (may be performed more frequently if at higher risk)  OR  FOBT/FIT: every 1 year  OR  Cologuard: every 3 years  OR  Sigmoidoscopy: every 5 years  Screening may be recommended earlier than age 45 if at higher risk for colorectal cancer. Also, an individualized decision between you and your healthcare provider will decide whether screening between the ages of 76-85 would be appropriate. Colonoscopy: 01/20/2021  FOBT/FIT: Not on file  Cologuard: Not on file  Sigmoidoscopy: Not on file    Screening Current     Breast Cancer Screening Age: 40+ years old  Frequency: every 1-2 years  Not required if history of left and right mastectomy Mammogram: 02/06/2024    Screening Current   Cervical Cancer Screening Between the ages of 21-29, pap smear recommended once every 3 years.   Between the ages of 30-65, can perform pap smear with HPV co-testing every 5 years.   Recommendations may differ for women with a history of total hysterectomy, cervical cancer, or abnormal pap smears in past. Pap Smear: 11/13/2023    Screening Not Indicated   Hepatitis C Screening Once for adults born between 1945 and 1965  More frequently in  patients at high risk for Hepatitis C Hep C Antibody: 12/08/2020    Screening Current   Diabetes Screening 1-2 times per year if you're at risk for diabetes or have pre-diabetes Fasting glucose: 111 mg/dL (1/11/2024)  A1C: 6.2 % (2/1/2023)  Screening Not Indicated  History Diabetes   Cholesterol Screening Once every 5 years if you don't have a lipid disorder. May order more often based on risk factors. Lipid panel: 03/21/2022    Screening Not Indicated  History Lipid Disorder     Other Preventive Screenings Covered by Medicare:  Abdominal Aortic Aneurysm (AAA) Screening: covered once if your at risk. You're considered to be at risk if you have a family history of AAA.  Lung Cancer Screening: covers low dose CT scan once per year if you meet all of the following conditions: (1) Age 55-77; (2) No signs or symptoms of lung cancer; (3) Current smoker or have quit smoking within the last 15 years; (4) You have a tobacco smoking history of at least 20 pack years (packs per day multiplied by number of years you smoked); (5) You get a written order from a healthcare provider.  Glaucoma Screening: covered annually if you're considered high risk: (1) You have diabetes OR (2) Family history of glaucoma OR (3)  aged 50 and older OR (4)  American aged 65 and older  Osteoporosis Screening: covered every 2 years if you meet one of the following conditions: (1) You're estrogen deficient and at risk for osteoporosis based off medical history and other findings; (2) Have a vertebral abnormality; (3) On glucocorticoid therapy for more than 3 months; (4) Have primary hyperparathyroidism; (5) On osteoporosis medications and need to assess response to drug therapy.   Last bone density test (DXA Scan): Not on file.  HIV Screening: covered annually if you're between the age of 15-65. Also covered annually if you are younger than 15 and older than 65 with risk factors for HIV infection. For pregnant patients, it is  covered up to 3 times per pregnancy.    Immunizations:  Immunization Recommendations   Influenza Vaccine Annual influenza vaccination during flu season is recommended for all persons aged >= 6 months who do not have contraindications   Pneumococcal Vaccine   * Pneumococcal conjugate vaccine = PCV13 (Prevnar 13), PCV15 (Vaxneuvance), PCV20 (Prevnar 20)  * Pneumococcal polysaccharide vaccine = PPSV23 (Pneumovax) Adults 19-65 yo with certain risk factors or if 65+ yo  If never received any pneumonia vaccine: recommend Prevnar 20 (PCV20)  Give PCV20 if previously received 1 dose of PCV13 or PPSV23   Hepatitis B Vaccine 3 dose series if at intermediate or high risk (ex: diabetes, end stage renal disease, liver disease)   Respiratory syncytial virus (RSV) Vaccine - COVERED BY MEDICARE PART D  * RSVPreF3 (Arexvy) CDC recommends that adults 60 years of age and older may receive a single dose of RSV vaccine using shared clinical decision-making (SCDM)   Tetanus (Td) Vaccine - COST NOT COVERED BY MEDICARE PART B Following completion of primary series, a booster dose should be given every 10 years to maintain immunity against tetanus. Td may also be given as tetanus wound prophylaxis.   Tdap Vaccine - COST NOT COVERED BY MEDICARE PART B Recommended at least once for all adults. For pregnant patients, recommended with each pregnancy.   Shingles Vaccine (Shingrix) - COST NOT COVERED BY MEDICARE PART B  2 shot series recommended in those 19 years and older who have or will have weakened immune systems or those 50 years and older     Health Maintenance Due:      Topic Date Due   • Breast Cancer Screening: Mammogram  02/06/2025   • Colorectal Cancer Screening  01/20/2026   • Hepatitis C Screening  Completed     Immunizations Due:      Topic Date Due   • Influenza Vaccine (1) 09/01/2023   • COVID-19 Vaccine (4 - 2023-24 season) 09/01/2023     Advance Directives   What are advance directives?  Advance directives are legal documents  that state your wishes and plans for medical care. These plans are made ahead of time in case you lose your ability to make decisions for yourself. Advance directives can apply to any medical decision, such as the treatments you want, and if you want to donate organs.   What are the types of advance directives?  There are many types of advance directives, and each state has rules about how to use them. You may choose a combination of any of the following:  Living will:  This is a written record of the treatment you want. You can also choose which treatments you do not want, which to limit, and which to stop at a certain time. This includes surgery, medicine, IV fluid, and tube feedings.   Durable power of  for healthcare (DPAHC):  This is a written record that states who you want to make healthcare choices for you when you are unable to make them for yourself. This person, called a proxy, is usually a family member or a friend. You may choose more than 1 proxy.  Do not resuscitate (DNR) order:  A DNR order is used in case your heart stops beating or you stop breathing. It is a request not to have certain forms of treatment, such as CPR. A DNR order may be included in other types of advance directives.  Medical directive:  This covers the care that you want if you are in a coma, near death, or unable to make decisions for yourself. You can list the treatments you want for each condition. Treatment may include pain medicine, surgery, blood transfusions, dialysis, IV or tube feedings, and a ventilator (breathing machine).  Values history:  This document has questions about your views, beliefs, and how you feel and think about life. This information can help others choose the care that you would choose.  Why are advance directives important?  An advance directive helps you control your care. Although spoken wishes may be used, it is better to have your wishes written down. Spoken wishes can be misunderstood, or  not followed. Treatments may be given even if you do not want them. An advance directive may make it easier for your family to make difficult choices about your care.   Urinary Incontinence   Urinary incontinence (UI)  is when you lose control of your bladder. UI develops because your bladder cannot store or empty urine properly. The 3 most common types of UI are stress incontinence, urge incontinence, or both.  Medicines:   May be given to help strengthen your bladder control. Report any side effects of medication to your healthcare provider.  Do pelvic muscle exercises often:  Your pelvic muscles help you stop urinating. Squeeze these muscles tight for 5 seconds, then relax for 5 seconds. Gradually work up to squeezing for 10 seconds. Do 3 sets of 15 repetitions a day, or as directed. This will help strengthen your pelvic muscles and improve bladder control.  Train your bladder:  Go to the bathroom at set times, such as every 2 hours, even if you do not feel the urge to go. You can also try to hold your urine when you feel the urge to go. For example, hold your urine for 5 minutes when you feel the urge to go. As that becomes easier, hold your urine for 10 minutes.   Self-care:   Keep a UI record.  Write down how often you leak urine and how much you leak. Make a note of what you were doing when you leaked urine.  Drink liquids as directed. You may need to limit the amount of liquid you drink to help control your urine leakage. Do not drink any liquid right before you go to bed. Limit or do not have drinks that contain caffeine or alcohol.   Prevent constipation.  Eat a variety of high-fiber foods. Good examples are high-fiber cereals, beans, vegetables, and whole-grain breads. Walking is the best way to trigger your intestines to have a bowel movement.  Exercise regularly and maintain a healthy weight.  Weight loss and exercise will decrease pressure on your bladder and help you control your leakage.   Use a  catheter as directed  to help empty your bladder. A catheter is a tiny, plastic tube that is put into your bladder to drain your urine.   Go to behavior therapy as directed.  Behavior therapy may be used to help you learn to control your urge to urinate.    Cigarette Smoking and Your Health   Risks to your health if you smoke:  Nicotine and other chemicals found in tobacco damage every cell in your body. Even if you are a light smoker, you have an increased risk for cancer, heart disease, and lung disease. If you are pregnant or have diabetes, smoking increases your risk for complications.   Benefits to your health if you stop smoking:   You decrease respiratory symptoms such as coughing, wheezing, and shortness of breath.   You reduce your risk for cancers of the lung, mouth, throat, kidney, bladder, pancreas, stomach, and cervix. If you already have cancer, you increase the benefits of chemotherapy. You also reduce your risk for cancer returning or a second cancer from developing.   You reduce your risk for heart disease, blood clots, heart attack, and stroke.   You reduce your risk for lung infections, and diseases such as pneumonia, asthma, chronic bronchitis, and emphysema.  Your circulation improves. More oxygen can be delivered to your body. If you have diabetes, you lower your risk for complications, such as kidney, artery, and eye diseases. You also lower your risk for nerve damage. Nerve damage can lead to amputations, poor vision, and blindness.  You improve your body's ability to heal and to fight infections.  For more information and support to stop smoking:   Smokefree.gov  Phone: 0- 483 - 700-7627  Web Address: www.[employer]     © Copyright [employer] 2018 Information is for End User's use only and may not be sold, redistributed or otherwise used for commercial purposes. All illustrations and images included in CareNotes® are the copyrighted property of A.D.A.M., Inc. or [employer]  [facility]      Medicare Preventive Visit Patient Instructions  Thank you for completing your Welcome to Medicare Visit or Medicare Annual Wellness Visit today. Your next wellness visit will be due in one year (3/22/2025).  The screening/preventive services that you may require over the next 5-10 years are detailed below. Some tests may not apply to you based off risk factors and/or age. Screening tests ordered at today's visit but not completed yet may show as past due. Also, please note that scanned in results may not display below.  Preventive Screenings:  Service Recommendations Previous Testing/Comments   Colorectal Cancer Screening  * Colonoscopy    * Fecal Occult Blood Test (FOBT)/Fecal Immunochemical Test (FIT)  * Fecal DNA/Cologuard Test  * Flexible Sigmoidoscopy Age: 45-75 years old   Colonoscopy: every 10 years (may be performed more frequently if at higher risk)  OR  FOBT/FIT: every 1 year  OR  Cologuard: every 3 years  OR  Sigmoidoscopy: every 5 years  Screening may be recommended earlier than age 45 if at higher risk for colorectal cancer. Also, an individualized decision between you and your healthcare provider will decide whether screening between the ages of 76-85 would be appropriate. Colonoscopy: 01/20/2021  FOBT/FIT: Not on file  Cologuard: Not on file  Sigmoidoscopy: Not on file    Screening Current     Breast Cancer Screening Age: 40+ years old  Frequency: every 1-2 years  Not required if history of left and right mastectomy Mammogram: 02/06/2024    Screening Current   Cervical Cancer Screening Between the ages of 21-29, pap smear recommended once every 3 years.   Between the ages of 30-65, can perform pap smear with HPV co-testing every 5 years.   Recommendations may differ for women with a history of total hysterectomy, cervical cancer, or abnormal pap smears in past. Pap Smear: 11/13/2023    Screening Not Indicated   Hepatitis C Screening Once for adults born between 1945 and 1965  More  frequently in patients at high risk for Hepatitis C Hep C Antibody: 12/08/2020    Screening Current   Diabetes Screening 1-2 times per year if you're at risk for diabetes or have pre-diabetes Fasting glucose: 111 mg/dL (1/11/2024)  A1C: 6.2 % (2/1/2023)  Screening Not Indicated  History Diabetes   Cholesterol Screening Once every 5 years if you don't have a lipid disorder. May order more often based on risk factors. Lipid panel: 03/21/2022    Screening Not Indicated  History Lipid Disorder     Other Preventive Screenings Covered by Medicare:  Abdominal Aortic Aneurysm (AAA) Screening: covered once if your at risk. You're considered to be at risk if you have a family history of AAA.  Lung Cancer Screening: covers low dose CT scan once per year if you meet all of the following conditions: (1) Age 55-77; (2) No signs or symptoms of lung cancer; (3) Current smoker or have quit smoking within the last 15 years; (4) You have a tobacco smoking history of at least 20 pack years (packs per day multiplied by number of years you smoked); (5) You get a written order from a healthcare provider.  Glaucoma Screening: covered annually if you're considered high risk: (1) You have diabetes OR (2) Family history of glaucoma OR (3)  aged 50 and older OR (4)  American aged 65 and older  Osteoporosis Screening: covered every 2 years if you meet one of the following conditions: (1) You're estrogen deficient and at risk for osteoporosis based off medical history and other findings; (2) Have a vertebral abnormality; (3) On glucocorticoid therapy for more than 3 months; (4) Have primary hyperparathyroidism; (5) On osteoporosis medications and need to assess response to drug therapy.   Last bone density test (DXA Scan): Not on file.  HIV Screening: covered annually if you're between the age of 15-65. Also covered annually if you are younger than 15 and older than 65 with risk factors for HIV infection. For pregnant  patients, it is covered up to 3 times per pregnancy.    Immunizations:  Immunization Recommendations   Influenza Vaccine Annual influenza vaccination during flu season is recommended for all persons aged >= 6 months who do not have contraindications   Pneumococcal Vaccine   * Pneumococcal conjugate vaccine = PCV13 (Prevnar 13), PCV15 (Vaxneuvance), PCV20 (Prevnar 20)  * Pneumococcal polysaccharide vaccine = PPSV23 (Pneumovax) Adults 19-63 yo with certain risk factors or if 65+ yo  If never received any pneumonia vaccine: recommend Prevnar 20 (PCV20)  Give PCV20 if previously received 1 dose of PCV13 or PPSV23   Hepatitis B Vaccine 3 dose series if at intermediate or high risk (ex: diabetes, end stage renal disease, liver disease)   Respiratory syncytial virus (RSV) Vaccine - COVERED BY MEDICARE PART D  * RSVPreF3 (Arexvy) CDC recommends that adults 60 years of age and older may receive a single dose of RSV vaccine using shared clinical decision-making (SCDM)   Tetanus (Td) Vaccine - COST NOT COVERED BY MEDICARE PART B Following completion of primary series, a booster dose should be given every 10 years to maintain immunity against tetanus. Td may also be given as tetanus wound prophylaxis.   Tdap Vaccine - COST NOT COVERED BY MEDICARE PART B Recommended at least once for all adults. For pregnant patients, recommended with each pregnancy.   Shingles Vaccine (Shingrix) - COST NOT COVERED BY MEDICARE PART B  2 shot series recommended in those 19 years and older who have or will have weakened immune systems or those 50 years and older     Health Maintenance Due:      Topic Date Due   • Breast Cancer Screening: Mammogram  02/06/2025   • Colorectal Cancer Screening  01/20/2026   • Hepatitis C Screening  Completed     Immunizations Due:      Topic Date Due   • Influenza Vaccine (1) 09/01/2023   • COVID-19 Vaccine (4 - 2023-24 season) 09/01/2023     Advance Directives   What are advance directives?  Advance directives are  legal documents that state your wishes and plans for medical care. These plans are made ahead of time in case you lose your ability to make decisions for yourself. Advance directives can apply to any medical decision, such as the treatments you want, and if you want to donate organs.   What are the types of advance directives?  There are many types of advance directives, and each state has rules about how to use them. You may choose a combination of any of the following:  Living will:  This is a written record of the treatment you want. You can also choose which treatments you do not want, which to limit, and which to stop at a certain time. This includes surgery, medicine, IV fluid, and tube feedings.   Durable power of  for healthcare (DPAHC):  This is a written record that states who you want to make healthcare choices for you when you are unable to make them for yourself. This person, called a proxy, is usually a family member or a friend. You may choose more than 1 proxy.  Do not resuscitate (DNR) order:  A DNR order is used in case your heart stops beating or you stop breathing. It is a request not to have certain forms of treatment, such as CPR. A DNR order may be included in other types of advance directives.  Medical directive:  This covers the care that you want if you are in a coma, near death, or unable to make decisions for yourself. You can list the treatments you want for each condition. Treatment may include pain medicine, surgery, blood transfusions, dialysis, IV or tube feedings, and a ventilator (breathing machine).  Values history:  This document has questions about your views, beliefs, and how you feel and think about life. This information can help others choose the care that you would choose.  Why are advance directives important?  An advance directive helps you control your care. Although spoken wishes may be used, it is better to have your wishes written down. Spoken wishes can be  misunderstood, or not followed. Treatments may be given even if you do not want them. An advance directive may make it easier for your family to make difficult choices about your care.   Urinary Incontinence   Urinary incontinence (UI)  is when you lose control of your bladder. UI develops because your bladder cannot store or empty urine properly. The 3 most common types of UI are stress incontinence, urge incontinence, or both.  Medicines:   May be given to help strengthen your bladder control. Report any side effects of medication to your healthcare provider.  Do pelvic muscle exercises often:  Your pelvic muscles help you stop urinating. Squeeze these muscles tight for 5 seconds, then relax for 5 seconds. Gradually work up to squeezing for 10 seconds. Do 3 sets of 15 repetitions a day, or as directed. This will help strengthen your pelvic muscles and improve bladder control.  Train your bladder:  Go to the bathroom at set times, such as every 2 hours, even if you do not feel the urge to go. You can also try to hold your urine when you feel the urge to go. For example, hold your urine for 5 minutes when you feel the urge to go. As that becomes easier, hold your urine for 10 minutes.   Self-care:   Keep a UI record.  Write down how often you leak urine and how much you leak. Make a note of what you were doing when you leaked urine.  Drink liquids as directed. You may need to limit the amount of liquid you drink to help control your urine leakage. Do not drink any liquid right before you go to bed. Limit or do not have drinks that contain caffeine or alcohol.   Prevent constipation.  Eat a variety of high-fiber foods. Good examples are high-fiber cereals, beans, vegetables, and whole-grain breads. Walking is the best way to trigger your intestines to have a bowel movement.  Exercise regularly and maintain a healthy weight.  Weight loss and exercise will decrease pressure on your bladder and help you control your  leakage.   Use a catheter as directed  to help empty your bladder. A catheter is a tiny, plastic tube that is put into your bladder to drain your urine.   Go to behavior therapy as directed.  Behavior therapy may be used to help you learn to control your urge to urinate.    Cigarette Smoking and Your Health   Risks to your health if you smoke:  Nicotine and other chemicals found in tobacco damage every cell in your body. Even if you are a light smoker, you have an increased risk for cancer, heart disease, and lung disease. If you are pregnant or have diabetes, smoking increases your risk for complications.   Benefits to your health if you stop smoking:   You decrease respiratory symptoms such as coughing, wheezing, and shortness of breath.   You reduce your risk for cancers of the lung, mouth, throat, kidney, bladder, pancreas, stomach, and cervix. If you already have cancer, you increase the benefits of chemotherapy. You also reduce your risk for cancer returning or a second cancer from developing.   You reduce your risk for heart disease, blood clots, heart attack, and stroke.   You reduce your risk for lung infections, and diseases such as pneumonia, asthma, chronic bronchitis, and emphysema.  Your circulation improves. More oxygen can be delivered to your body. If you have diabetes, you lower your risk for complications, such as kidney, artery, and eye diseases. You also lower your risk for nerve damage. Nerve damage can lead to amputations, poor vision, and blindness.  You improve your body's ability to heal and to fight infections.  For more information and support to stop smoking:   Smokefree.gov  Phone: 1- 479 - 052-5754  Web Address: www.[employer] (vitals.com)     © Copyright [employer] 2018 Information is for End User's use only and may not be sold, redistributed or otherwise used for commercial purposes. All illustrations and images included in CareNotes® are the copyrighted property of [employer].A[employer]M., Inc. or [employer]  [facility]

## 2024-03-22 PROBLEM — K59.00 CONSTIPATION: Status: ACTIVE | Noted: 2024-03-22

## 2024-03-22 LAB — SL AMB POCT HEMOGLOBIN AIC: 6.1 (ref ?–6.5)

## 2024-03-22 NOTE — ASSESSMENT & PLAN NOTE
Lab Results   Component Value Date    HGBA1C 6.1 03/22/2024     Currently at goal for A1c (<7%, <8% if >80)  Continue current regimen of diet control  Continue Ace-i/arb  IS on statin, continue/consider adding            repeat lipids q3yrs, due asap  IS following optho, next eye exam due 2024, encouraged f/u  NOT following podiatry, next foot exam due 3/2025  yearly micro albumin creatinine ratio is not UTD, due asap  Carb controlled diet, discussed healthy lifestyle modifications  consider DM nutrition referral

## 2024-03-22 NOTE — ASSESSMENT & PLAN NOTE
lives at home with self , feels safe at home  ADLs  6/6   IADLs 8/8   no ACP documents in place, given forms        no POA  confirmed medication compliance, refills as noted  continue mentally and physically engaging exercises and healthy diet and lifestyle

## 2024-03-22 NOTE — ASSESSMENT & PLAN NOTE
Likely compounded by biceps tendonitis  + speeds, yeargason and empty can test on L  Neg drop arm test  Passive ROM WNL  Active ROM flexion at shoulder limited to 100*  No trauma to area      Given free PT clinic info  Conservative management with tylenol, voltaren, ice/heat, fluids

## 2024-04-04 ENCOUNTER — APPOINTMENT (OUTPATIENT)
Dept: LAB | Facility: CLINIC | Age: 64
End: 2024-04-04
Payer: MEDICARE

## 2024-04-04 DIAGNOSIS — E11.9 TYPE 2 DIABETES MELLITUS WITHOUT COMPLICATION, WITHOUT LONG-TERM CURRENT USE OF INSULIN (HCC): ICD-10-CM

## 2024-04-04 LAB
CREAT UR-MCNC: 92.2 MG/DL
MICROALBUMIN UR-MCNC: 21.9 MG/L
MICROALBUMIN/CREAT 24H UR: 24 MG/G CREATININE (ref 0–30)

## 2024-04-04 PROCEDURE — 82570 ASSAY OF URINE CREATININE: CPT

## 2024-04-04 PROCEDURE — 82043 UR ALBUMIN QUANTITATIVE: CPT

## 2024-05-01 PROBLEM — Z00.00 MEDICARE ANNUAL WELLNESS VISIT, INITIAL: Status: RESOLVED | Noted: 2024-03-21 | Resolved: 2024-05-01

## 2024-05-13 ENCOUNTER — HOSPITAL ENCOUNTER (OUTPATIENT)
Dept: RADIOLOGY | Age: 64
Discharge: HOME | End: 2024-05-13
Payer: MEDICARE

## 2024-05-13 DIAGNOSIS — Z78.0 MENOPAUSE: ICD-10-CM

## 2024-05-13 PROCEDURE — 77080 DXA BONE DENSITY AXIAL: CPT

## 2024-05-20 DIAGNOSIS — G89.29 CHRONIC LEFT SHOULDER PAIN: ICD-10-CM

## 2024-05-20 DIAGNOSIS — M25.512 CHRONIC LEFT SHOULDER PAIN: ICD-10-CM

## 2024-05-21 ENCOUNTER — OFFICE VISIT (OUTPATIENT)
Dept: INTERNAL MEDICINE CLINIC | Facility: CLINIC | Age: 64
End: 2024-05-21

## 2024-05-21 VITALS
WEIGHT: 129.8 LBS | HEART RATE: 90 BPM | DIASTOLIC BLOOD PRESSURE: 72 MMHG | BODY MASS INDEX: 22.28 KG/M2 | TEMPERATURE: 98.6 F | OXYGEN SATURATION: 98 % | SYSTOLIC BLOOD PRESSURE: 131 MMHG

## 2024-05-21 DIAGNOSIS — L23.7 POISON IVY DERMATITIS: Primary | ICD-10-CM

## 2024-05-21 PROCEDURE — G2211 COMPLEX E/M VISIT ADD ON: HCPCS | Performed by: INTERNAL MEDICINE

## 2024-05-21 PROCEDURE — 3078F DIAST BP <80 MM HG: CPT | Performed by: INTERNAL MEDICINE

## 2024-05-21 PROCEDURE — 99213 OFFICE O/P EST LOW 20 MIN: CPT | Performed by: INTERNAL MEDICINE

## 2024-05-21 PROCEDURE — 3075F SYST BP GE 130 - 139MM HG: CPT | Performed by: INTERNAL MEDICINE

## 2024-05-21 NOTE — PROGRESS NOTES
[facility]  INTERNAL MEDICINE ACUTE VISIT     PATIENT INFORMATION     Ingrid Sanches   64 y.o. female   MRN: [id number]    ASSESSMENT/PLAN     Diagnoses and all orders for this visit:    Poison ivy dermatitis  Will prescribe clobetasol cream twice a day.  Advised to continue calamine lotion.   Can try cool water bath and cool wet compresses.  If no improvement in 4-5 days, advised to call clinic and consider oral steroids.   -     Clobetasol Propionate 0.025 % CREA; Apply 1 Application topically 2 (two) times a day      CHIEF COMPLAINT     Chief Complaint   Patient presents with    Rash     1 week, bilateral hand rash, some on face and chest. After touching leafs off a tree that fell     HISTORY OF PRESENT ILLNESS     64-year-old female with past medical history of type 2 diabetes, hypertension, anxiety, tobacco abuse presenting to the clinic as a same-day visit with rash.  Patient reports that was cutting into three 1 week ago and had contact with poison ivy.  Patient developed a rash day before and has been using Benadryl creams with no improvement.  Patient reports severe itchiness and pain and subjective fevers.  Denies any other systemic symptoms.  Denies having placed poison ivy dermatitis in the past.    REVIEW OF SYSTEMS     Review of Systems   Skin:  Positive for rash.     OBJECTIVE     Vitals:    05/21/24 1551   BP: 131/72   BP Location: Left arm   Patient Position: Sitting   Cuff Size: Standard   Pulse: 90   Temp: 98.6 °F (37 °C)   TempSrc: Temporal   SpO2: 98%   Weight: 58.9 kg (129 lb 12.8 oz)     Physical Exam  Vitals and nursing note reviewed.   Constitutional:       General: She is not in acute distress.     Appearance: She is well-developed.   HENT:      Head: Normocephalic and atraumatic.   Eyes:      Conjunctiva/sclera: Conjunctivae normal.   Cardiovascular:      Rate and Rhythm: Normal rate and regular rhythm.      Heart sounds: No murmur  heard.  Pulmonary:      Effort: Pulmonary effort is normal. No respiratory distress.      Breath sounds: Normal breath sounds.   Abdominal:      Palpations: Abdomen is soft.      Tenderness: There is no abdominal tenderness.   Musculoskeletal:         General: No swelling.      Cervical back: Neck supple.   Skin:     General: Skin is warm and dry.      Capillary Refill: Capillary refill takes less than 2 seconds.      Findings: Erythema, lesion and rash present.      Comments: Erythematous rash with blisters and hives   Neurological:      Mental Status: She is alert.   Psychiatric:         Mood and Affect: Mood normal.               CURRENT MEDICATIONS     Current Outpatient Medications:     Clobetasol Propionate 0.025 % CREA, Apply 1 Application topically 2 (two) times a day, Disp: 100 g, Rfl: 0    acetaminophen (TYLENOL) 500 mg tablet, Take 500 mg by mouth every 6 (six) hours as needed for mild pain, Disp: , Rfl:     amLODIPine (NORVASC) 5 mg tablet, TAKE ONE TABLET BY MOUTH DAILY, Disp: 90 tablet, Rfl: 3    ammonium lactate (LAC-HYDRIN) 12 % cream, Apply topically as needed for dry skin (Patient not taking: Reported on 2/19/2024), Disp: 385 g, Rfl: 0    cholecalciferol (VITAMIN D3) 1,000 units tablet, TAKE 2 TABLETS BY MOUTH DAILY AS DIRECTED, Disp: 180 tablet, Rfl: 3    Diclofenac Sodium (VOLTAREN) 1 %, APPLY 4 G TOPICALLY 4 (FOUR) TIMES A DAY, Disp: 300 g, Rfl: 1    docusate sodium (COLACE) 100 mg capsule, Take 1 capsule (100 mg total) by mouth 2 (two) times a day, Disp: 60 capsule, Rfl: 3    lisinopril (ZESTRIL) 20 mg tablet, TAKE ONE TABLET BY MOUTH DAILY AS DIRECTED, Disp: 90 tablet, Rfl: 3    rosuvastatin (CRESTOR) 40 MG tablet, TAKE ONE TABLET BY MOUTH DAILY, Disp: 90 tablet, Rfl: 3    urea (CARMOL) 40 %, Apply topically daily Apply topically daily to affected areas on bilateral plantar foot., Disp: 10 g, Rfl: 3    PAST MEDICAL & SURGICAL HISTORY     Past Medical History:   Diagnosis Date    Diabetes  mellitus (HCC)     Hyperlipidemia     Hypertension      Past Surgical History:   Procedure Laterality Date    BREAST BIOPSY Left 2021    stereo    BREAST EXCISIONAL BIOPSY Left     8 yrs ago benign in [place]    BREAST SURGERY  2016    removal of cyst      SECTION      COLONOSCOPY  2021    MAMMO STEREOTACTIC BREAST BIOPSY LEFT (ALL INC) Left 2021    TUBAL LIGATION      US GUIDED BREAST BIOPSY RIGHT COMPLETE Right 2021     SOCIAL & FAMILY HISTORY     Social History     Socioeconomic History    Marital status: Single     Spouse name: Not on file    Number of children: Not on file    Years of education: Not on file    Highest education level: Not on file   Occupational History    Not on file   Tobacco Use    Smoking status: Every Day     Current packs/day: 0.00     Types: Cigarettes     Last attempt to quit: 1981     Years since quittin.4    Smokeless tobacco: Never    Tobacco comments:     Smokes about 4 cigarettes a day    Vaping Use    Vaping status: Never Used   Substance and Sexual Activity    Alcohol use: Not Currently     Comment: social    Drug use: Never    Sexual activity: Not Currently     Birth control/protection: None, Post-menopausal   Other Topics Concern    Not on file   Social History Narrative    Not on file     Social Determinants of Health     Financial Resource Strain: Low Risk  (3/21/2024)    Overall Financial Resource Strain (CARDIA)     Difficulty of Paying Living Expenses: Not hard at all   Food Insecurity: No Food Insecurity (3/21/2024)    Hunger Vital Sign     Worried About Running Out of Food in the Last Year: Never true     Ran Out of Food in the Last Year: Never true   Transportation Needs: No Transportation Needs (3/21/2024)    PRAPARE - Transportation     Lack of Transportation (Medical): No     Lack of Transportation (Non-Medical): No   Physical Activity: Inactive (2021)    Exercise Vital Sign     Days of Exercise per Week: 0 days      Minutes of Exercise per Session: 0 min   Stress: No Stress Concern Present (2/3/2022)    [employer] - Occupational Stress Questionnaire     Feeling of Stress : Not at all   Social Connections: Socially Isolated (2021)    Social Connection and Isolation Panel [NHANES]     Frequency of Communication with Friends and Family: More than three times a week     Frequency of Social Gatherings with Friends and Family: More than three times a week     Attends Mu-ism Services: Never     Active Member of Clubs or Organizations: No     Attends Club or Organization Meetings: Never     Marital Status:    Intimate Partner Violence: Not At Risk (11/10/2020)    Humiliation, Afraid, Rape, and Kick questionnaire     Fear of Current or Ex-Partner: No     Emotionally Abused: No     Physically Abused: No     Sexually Abused: No   Housing Stability: Low Risk  (3/21/2024)    Housing Stability Vital Sign     Unable to Pay for Housing in the Last Year: No     Number of Places Lived in the Last Year: 1     Unstable Housing in the Last Year: No     Social History     Substance and Sexual Activity   Alcohol Use Not Currently    Comment: social       Social History     Substance and Sexual Activity   Drug Use Never     Social History     Tobacco Use   Smoking Status Every Day    Current packs/day: 0.00    Types: Cigarettes    Last attempt to quit: 1981    Years since quittin.4   Smokeless Tobacco Never   Tobacco Comments    Smokes about 4 cigarettes a day      Family History   Problem Relation Age of Onset    Diabetes Mother     Diabetes Sister     Diabetes Sister     Diabetes Sister     No Known Problems Sister     Endometrial cancer Sister 58    No Known Problems Daughter     No Known Problems Maternal Grandmother     No Known Problems Maternal Grandfather     Diabetes Paternal Grandmother     Cancer Paternal Grandmother     Endometrial cancer Maternal Aunt         unsure of age    Cancer  Maternal Aunt     No Known Problems Maternal Aunt     No Known Problems Maternal Aunt     No Known Problems Maternal Aunt     No Known Problems Maternal Aunt     No Known Problems Paternal Aunt     No Known Problems Paternal Aunt     No Known Problems Paternal Aunt     No Known Problems Paternal Aunt     No Known Problems Paternal Aunt     No Known Problems Paternal Aunt     No Known Problems Paternal Aunt     No Known Problems Son     No Known Problems Son              ==  Geno JAYNA Coyle MD PGY3  [facility] Internal Medicine Residency    [address], [address]  [place] [address]  Office: [phone]  Fax: [phone]

## 2024-05-24 ENCOUNTER — TELEPHONE (OUTPATIENT)
Dept: INTERNAL MEDICINE CLINIC | Facility: CLINIC | Age: 64
End: 2024-05-24

## 2024-05-24 NOTE — TELEPHONE ENCOUNTER
Received call from patient stating the cream ordered is not covered by her insurance and will cost over $500. Requesting another medication option.     Confirmed with [facility] pharmacist that medication is not covered.     Please review and advise.

## 2024-05-29 DIAGNOSIS — L23.7 POISON IVY DERMATITIS: Primary | ICD-10-CM

## 2024-05-29 DIAGNOSIS — J06.9 VIRAL URI: Primary | ICD-10-CM

## 2024-05-29 RX ORDER — METHYLPREDNISOLONE 4 MG/1
TABLET ORAL
Qty: 21 EACH | Refills: 0 | Status: SHIPPED | OUTPATIENT
Start: 2024-05-29

## 2024-06-19 DIAGNOSIS — M25.512 CHRONIC LEFT SHOULDER PAIN: ICD-10-CM

## 2024-06-19 DIAGNOSIS — G89.29 CHRONIC LEFT SHOULDER PAIN: ICD-10-CM

## 2024-07-03 ENCOUNTER — RA CDI HCC (OUTPATIENT)
Dept: OTHER | Facility: HOSPITAL | Age: 64
End: 2024-07-03

## 2024-07-11 ENCOUNTER — OFFICE VISIT (OUTPATIENT)
Dept: INTERNAL MEDICINE CLINIC | Facility: CLINIC | Age: 64
End: 2024-07-11

## 2024-07-11 VITALS
SYSTOLIC BLOOD PRESSURE: 111 MMHG | WEIGHT: 127.4 LBS | HEART RATE: 80 BPM | BODY MASS INDEX: 21.87 KG/M2 | DIASTOLIC BLOOD PRESSURE: 63 MMHG | TEMPERATURE: 98.6 F | OXYGEN SATURATION: 98 %

## 2024-07-11 DIAGNOSIS — Z23 ENCOUNTER FOR IMMUNIZATION: ICD-10-CM

## 2024-07-11 DIAGNOSIS — E11.9 TYPE 2 DIABETES MELLITUS WITHOUT COMPLICATION, WITHOUT LONG-TERM CURRENT USE OF INSULIN (HCC): ICD-10-CM

## 2024-07-11 DIAGNOSIS — K59.00 CONSTIPATION, UNSPECIFIED CONSTIPATION TYPE: ICD-10-CM

## 2024-07-11 DIAGNOSIS — M54.40 BACK PAIN OF LUMBAR REGION WITH SCIATICA: Primary | ICD-10-CM

## 2024-07-11 LAB — SL AMB POCT HEMOGLOBIN AIC: 6.4 (ref ?–6.5)

## 2024-07-11 RX ORDER — DOCUSATE SODIUM 250 MG
250 CAPSULE ORAL 2 TIMES DAILY
Qty: 180 CAPSULE | Refills: 3 | Status: SHIPPED | OUTPATIENT
Start: 2024-07-11

## 2024-07-11 RX ORDER — LIDOCAINE 40 MG/G
CREAM TOPICAL AS NEEDED
Qty: 28 G | Refills: 3 | Status: SHIPPED | OUTPATIENT
Start: 2024-07-11

## 2024-07-11 RX ORDER — GABAPENTIN 100 MG/1
100 CAPSULE ORAL
Qty: 30 CAPSULE | Refills: 5 | Status: SHIPPED | OUTPATIENT
Start: 2024-07-11

## 2024-07-11 NOTE — PROGRESS NOTES
[facility]  INTERNAL MEDICINE OFFICE VISIT     PATIENT INFORMATION     Ingrid Sanches   64 y.o. female   MRN: [id number]    ASSESSMENT/PLAN     1. Back pain of lumbar region with sciatica  Assessment & Plan:  Muscle strain vs radiculopathy vs stress fx vs spinal stenosis vs sciatica/piriformis syndrome vs arthritis/DJD    Encouraged PT, patient referral via free clinic  Encouraged exercise as tolerated, particularly walking   Handout provided  Consider robaxin vs flexeril vs short steroid course if no improvement  Encouraged plenty of fluids and ice pack/heat packs in addition to peppermint oil for muscle relaxation  No reg flag symptoms at this time  Discussed red flag symptoms of bowel and bladder incontinence, which would require immediate ED visit  XRAys r/o fracture or arthritis  Will likely need MRI if no improvement 6 weeks with conservative treatment  Med rec completed and compliant with regimen  Consider Arthritis workup if refractory   May consider sacroiliitis HLA b27 testing in future based on imaging or clinical course   May also consider short course of steroids  deferring referral to comprehensive spine at this time as patient has limited transportation   Declines jomar application    Orders:  -     lidocaine (LMX) 4 % cream; Apply topically as needed for mild pain  -     XR spine lumbar 2 or 3 views injury; Future; Expected date: 07/11/2024  -     gabapentin (NEURONTIN) 100 mg capsule; Take 1 capsule (100 mg total) by mouth daily at bedtime  2. Type 2 diabetes mellitus without complication, without long-term current use of insulin ([facility])  Assessment & Plan:    Lab Results   Component Value Date    HGBA1C 6.4 07/11/2024     Currently at goal for A1c (<7%, <8% if >80)  Continue current regimen of diet control  Continue Ace-i/arb  IS on statin, continue/consider adding            repeat lipids q3yrs, due asap  IS following optho, next eye exam due 2024,  encouraged f/u  NOT following podiatry, next foot exam due 3/2025  yearly micro albumin creatinine ratio is not UTD, due asap  Carb controlled diet, discussed healthy lifestyle modifications  consider DM nutrition referral     Orders:  -     POCT hemoglobin A1c  -     Ambulatory Referral to Optometry; Future  -     gabapentin (NEURONTIN) 100 mg capsule; Take 1 capsule (100 mg total) by mouth daily at bedtime  3. Encounter for immunization  -     Zoster Vaccine Recombinant IM  4. Constipation, unspecified constipation type  -     docusate sodium (COLACE) 250 MG capsule; Take 1 capsule (250 mg total) by mouth 2 (two) times a day        Schedule a follow-up appointment in 3mo recheck BP and back pain with me.    HEALTH MAINTENANCE     Immunization History   Administered Date(s) Administered    COVID-19 PFIZER VACCINE 0.3 ML IM 04/10/2021, 04/29/2021, 01/12/2022    Hep A, adult 10/28/2020    Hep B, adult 10/28/2020, 12/04/2020    Influenza, recombinant, quadrivalent,injectable, preservative free 09/23/2020, 03/01/2022, 01/24/2023    Pneumococcal Conjugate Vaccine 20-valent (Pcv20), Polysace 08/02/2022    Pneumococcal Polysaccharide PPV23 03/15/2021    Tdap 10/28/2020     Immunizations:  UTD  Screening:  UTD       CHIEF COMPLAINT     Chief Complaint   Patient presents with    Follow-up     Recheck biceps tendonitis with me. 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 Compliant with all medications. Biceps tendonitis and hip pain completely resolved with voltaren and tylenol. Pt unable to attend PT, even free PT clinic as limited transportation.    Back pain acutely, started about 3-5 days ago, though she notes she has  had intermittent chronic back pain for years. No recent trauma to area, straining workout or activities, bowel or bladder incontinence, fevers, muscle weakness, or numbness/tingling. She rates the pain as moderate, worse with prolonged sitting or standing. Pain travels into feet, though R side worse symptoms than left. She has tried voltaren and tylenol, which bring the pain down to 0-2, but within an hour or two of dosing, pain returns to 7-8. Does interrupt sleep and QOL.       REVIEW OF SYSTEMS     Review of Systems   Constitutional:  Negative for diaphoresis and fever.   Eyes:  Negative for visual disturbance.   Respiratory:  Negative for shortness of breath.    Cardiovascular:  Negative for chest pain and palpitations.   Gastrointestinal:  Positive for constipation. Negative for abdominal pain, diarrhea, nausea and vomiting.   Genitourinary:  Negative for difficulty urinating and dyspareunia.   Musculoskeletal:  Positive for back pain.   Neurological:  Positive for light-headedness (intermittent). Negative for dizziness, syncope and headaches.   Psychiatric/Behavioral:  Negative for sleep disturbance.      OBJECTIVE     Vitals:    07/11/24 1240   BP: 111/63   BP Location: Left arm   Patient Position: Sitting   Cuff Size: Standard   Pulse: 80   Temp: 98.6 °F (37 °C)   TempSrc: Temporal   SpO2: 98%   Weight: 57.8 kg (127 lb 6.4 oz)     Physical Exam  Constitutional:       General: She is not in acute distress.     Appearance: She is well-developed. She is obese.   HENT:      Head: Normocephalic and atraumatic.   Eyes:      General: No scleral icterus.     Conjunctiva/sclera: Conjunctivae normal.   Neck:      Thyroid: No thyromegaly.   Cardiovascular:      Rate and Rhythm: Normal rate and regular rhythm.      Heart sounds: Normal heart sounds. No murmur heard.     No friction rub. No gallop.   Pulmonary:      Effort: Pulmonary effort is normal. No respiratory distress.      Breath sounds: Normal breath sounds. No  stridor. No wheezing or rales.   Abdominal:      General: Bowel sounds are normal. There is no distension.      Palpations: Abdomen is soft. There is no mass.      Tenderness: There is no abdominal tenderness. There is no guarding or rebound.   Musculoskeletal:         General: No deformity.      Cervical back: Neck supple.      Right lower leg: No edema.      Left lower leg: No edema.      Comments: Limited ROM upon passive and active extension and b/l Side bending  ROM WNL flextion at hip passive and active  +b/l straight leg raise, though R>L   Skin:     General: Skin is warm.      Capillary Refill: Capillary refill takes less than 2 seconds.      Findings: No erythema or rash.   Neurological:      Mental Status: She is alert and oriented to person, place, and time.      Motor: No weakness (5/5 b/l LE).   Psychiatric:         Mood and Affect: Mood normal.         Behavior: Behavior normal.         Thought Content: Thought content normal.         Judgment: Judgment normal.         Pertinent Laboratory/Diagnostic Studies:  CBC:   Lab Results   Component Value Date/Time    WBC 11.82 (H) 02/01/2023 10:44 AM    RBC 5.40 (H) 02/01/2023 10:44 AM    HGB 14.2 02/01/2023 10:44 AM    HCT 46.2 (H) 02/01/2023 10:44 AM    MCV 86 02/01/2023 10:44 AM    MCH 26.3 (L) 02/01/2023 10:44 AM    MCHC 30.7 (L) 02/01/2023 10:44 AM    RDW 16.5 (H) 02/01/2023 10:44 AM    MPV 10.6 02/01/2023 10:44 AM     02/01/2023 10:44 AM    NRBC 0 09/07/2021 09:22 AM    NEUTOPHILPCT 43 09/07/2021 09:22 AM    LYMPHOPCT 49 (H) 06/08/2022 09:10 AM    LYMPHOPCT 48 (H) 09/07/2021 09:22 AM    MONOPCT 7 06/08/2022 09:10 AM    MONOPCT 7 09/07/2021 09:22 AM    EOSPCT 1 06/08/2022 09:10 AM    EOSPCT 1 09/07/2021 09:22 AM    BASOPCT 3 (H) 06/08/2022 09:10 AM    BASOPCT 1 09/07/2021 09:22 AM    NEUTROABS 5.63 09/07/2021 09:22 AM    LYMPHSABS 6.19 (H) 09/07/2021 09:22 AM    MONOSABS 0.86 09/07/2021 09:22 AM    EOSABS 0.12 06/08/2022 09:10 AM    EOSABS 0.17  "09/07/2021 09:22 AM     Chemistry Profile:   Lab Results   Component Value Date/Time    K 4.4 01/11/2024 11:13 AM     01/11/2024 11:13 AM    CO2 28 01/11/2024 11:13 AM    BUN 10 01/11/2024 11:13 AM    CREATININE 0.59 (L) 01/11/2024 11:13 AM    GLUC 106 03/01/2022 10:43 AM    GLUF 111 (H) 01/11/2024 11:13 AM    CALCIUM 9.7 01/11/2024 11:13 AM    PHOS 3.2 03/01/2022 10:43 AM    AST 26 01/11/2024 11:13 AM    ALT 24 01/11/2024 11:13 AM    ALKPHOS 69 01/11/2024 11:13 AM    EGFR 96 01/11/2024 11:13 AM     No results for input(s): \"HDL\", \"LDLCALC\" in the last 25263 hours.            Invalid input(s): \"LABALBU\"      Lab Results   Component Value Date    PHOS 3.2 03/01/2022              No results found for: \"TROPONINI\"  ABG:No results found for: \"PHART\", \"REE0NST\", \"PO2ART\", \"XRU6ZHJ\", \"J5SYQYSH\", \"BEART\", \"SOURCE\"    CURRENT MEDICATIONS     Current Outpatient Medications:     docusate sodium (COLACE) 250 MG capsule, Take 1 capsule (250 mg total) by mouth 2 (two) times a day, Disp: 180 capsule, Rfl: 3    gabapentin (NEURONTIN) 100 mg capsule, Take 1 capsule (100 mg total) by mouth daily at bedtime, Disp: 30 capsule, Rfl: 5    lidocaine (LMX) 4 % cream, Apply topically as needed for mild pain, Disp: 28 g, Rfl: 3    acetaminophen (TYLENOL) 500 mg tablet, Take 500 mg by mouth every 6 (six) hours as needed for mild pain, Disp: , Rfl:     amLODIPine (NORVASC) 5 mg tablet, TAKE ONE TABLET BY MOUTH DAILY, Disp: 90 tablet, Rfl: 3    cholecalciferol (VITAMIN D3) 1,000 units tablet, TAKE 2 TABLETS BY MOUTH DAILY AS DIRECTED, Disp: 180 tablet, Rfl: 3    Diclofenac Sodium (VOLTAREN) 1 %, APPLY 4 G TOPICALLY 4 (FOUR) TIMES A DAY, Disp: 300 g, Rfl: 1    lisinopril (ZESTRIL) 20 mg tablet, TAKE ONE TABLET BY MOUTH DAILY AS DIRECTED, Disp: 90 tablet, Rfl: 3    rosuvastatin (CRESTOR) 40 MG tablet, TAKE ONE TABLET BY MOUTH DAILY, Disp: 90 tablet, Rfl: 3    urea (CARMOL) 40 %, Apply topically daily Apply topically daily to affected areas " on bilateral plantar foot., Disp: 10 g, Rfl: 3    PAST MEDICAL & SURGICAL HISTORY     Past Medical History:   Diagnosis Date    Diabetes mellitus (HCC)     Hyperlipidemia     Hypertension      Past Surgical History:   Procedure Laterality Date    BREAST BIOPSY Left 2021    stereo    BREAST EXCISIONAL BIOPSY Left     8 yrs ago benign in [place]    BREAST SURGERY  2016    removal of cyst      SECTION      COLONOSCOPY  2021    MAMMO STEREOTACTIC BREAST BIOPSY LEFT (ALL INC) Left 2021    TUBAL LIGATION      US GUIDED BREAST BIOPSY RIGHT COMPLETE Right 2021     SOCIAL & FAMILY HISTORY     Social History     Socioeconomic History    Marital status: Single     Spouse name: Not on file    Number of children: Not on file    Years of education: Not on file    Highest education level: Not on file   Occupational History    Not on file   Tobacco Use    Smoking status: Every Day     Current packs/day: 0.00     Types: Cigarettes     Last attempt to quit: 1981     Years since quittin.5    Smokeless tobacco: Never    Tobacco comments:     Smokes about 4 cigarettes a day    Vaping Use    Vaping status: Never Used   Substance and Sexual Activity    Alcohol use: Not Currently     Comment: social    Drug use: Never    Sexual activity: Not Currently     Birth control/protection: None, Post-menopausal   Other Topics Concern    Not on file   Social History Narrative    Not on file     Social Determinants of Health     Financial Resource Strain: Low Risk  (3/21/2024)    Overall Financial Resource Strain (CARDIA)     Difficulty of Paying Living Expenses: Not hard at all   Food Insecurity: No Food Insecurity (3/21/2024)    Hunger Vital Sign     Worried About Running Out of Food in the Last Year: Never true     Ran Out of Food in the Last Year: Never true   Transportation Needs: No Transportation Needs (3/21/2024)    PRAPARE - Transportation     Lack of Transportation (Medical): No     Lack of  Transportation (Non-Medical): No   Physical Activity: Inactive (2021)    Exercise Vital Sign     Days of Exercise per Week: 0 days     Minutes of Exercise per Session: 0 min   Stress: No Stress Concern Present (2/3/2022)    [employer] - Occupational Stress Questionnaire     Feeling of Stress : Not at all   Social Connections: Socially Isolated (2021)    Social Connection and Isolation Panel [NHANES]     Frequency of Communication with Friends and Family: More than three times a week     Frequency of Social Gatherings with Friends and Family: More than three times a week     Attends Orthodoxy Services: Never     Active Member of Clubs or Organizations: No     Attends Club or Organization Meetings: Never     Marital Status:    Intimate Partner Violence: Not At Risk (11/10/2020)    Humiliation, Afraid, Rape, and Kick questionnaire     Fear of Current or Ex-Partner: No     Emotionally Abused: No     Physically Abused: No     Sexually Abused: No   Housing Stability: Low Risk  (3/21/2024)    Housing Stability Vital Sign     Unable to Pay for Housing in the Last Year: No     Number of Times Moved in the Last Year: 1     Homeless in the Last Year: No     Social History     Substance and Sexual Activity   Alcohol Use Not Currently    Comment: social       Social History     Substance and Sexual Activity   Drug Use Never     Social History     Tobacco Use   Smoking Status Every Day    Current packs/day: 0.00    Types: Cigarettes    Last attempt to quit: 1981    Years since quittin.5   Smokeless Tobacco Never   Tobacco Comments    Smokes about 4 cigarettes a day      Family History   Problem Relation Age of Onset    Diabetes Mother     Diabetes Sister     Diabetes Sister     Diabetes Sister     No Known Problems Sister     Endometrial cancer Sister 58    No Known Problems Daughter     No Known Problems Maternal Grandmother     No Known Problems Maternal Grandfather      Diabetes Paternal Grandmother     Cancer Paternal Grandmother     Endometrial cancer Maternal Aunt         unsure of age    Cancer Maternal Aunt     No Known Problems Maternal Aunt     No Known Problems Maternal Aunt     No Known Problems Maternal Aunt     No Known Problems Maternal Aunt     No Known Problems Paternal Aunt     No Known Problems Paternal Aunt     No Known Problems Paternal Aunt     No Known Problems Paternal Aunt     No Known Problems Paternal Aunt     No Known Problems Paternal Aunt     No Known Problems Paternal Aunt     No Known Problems Son     No Known Problems Son      ==  Raquel Couch DO    [facility] Internal Medicine Res[facility]    [address], [address]  [place] [address]  Office: [phone]  Fax: [phone]

## 2024-07-11 NOTE — PATIENT INSTRUCTIONS
"Zapatos con curva para suporta  Dr. Pedraza para los ojos or [employer]  Maquina de brazo para presion or vaya a farmacia  Patient Education     Instrucciones para el nick hospitalaria después salud ciática   Acerca de radha mehul   Usted puede tener dolor, debilidad, entumecimiento y hormigueo que se extienden desde los glúteos hasta la parte posterior de las piernas y los pies. Es lo que se llama \"ciática\". El nervio ciático es un nervio alice que comienza en la parte baja de la espalda. Recorre toda la parte posterior de la pierna. Es posible que un disco o un espolón óseo estén presionando radha nervio. Cuando algo está presionando o lastimando radha nervio, puede causar ciática. Radha es el nombre médico para el dolor, la debilidad, el adormecimiento u hormigueo que desde la nalga, pasa por la pierna y hasta el talón. Puede tener dolor ciático en un lado o en ambos lados. Por lo general, el dolor se aliviará sin necesitad de cirugía.       ¿Qué cuidados se necesitan en casa?   Pregúntele al médico qué debe hacer al llegar a casa. Asegúrese de hacer preguntas si no comprende lo que dice el médico. Así sabrá qué debe hacer.  Manténgase tan activo oli pueda sin que le cause demasiado dolor. Está juan descansar sobre la espalda un día más o menos. Asegúrese de levantarse y caminar con cuidado brenden el día en la medida que pueda hacerlo. Después de unos días, comience a aumentar lentamente meyers nivel de actividad en la medida que pueda hacerlo. Si algo hace que meyers dolor regrese o empeore, deténgase y vuelva a realizar actividades más sencillas que no le produjeron dolor.  No se siente ni se pare en la misma posición por mucho tiempo. Puede que desee dormir con salud almohada debajo o entre las rodillas si esto shelia meyers dolor.  Puede walt medicamentos, oli ibuprofeno o naproxeno, para la hinchazón y el dolor. Estos son medicamentos antiinflamatorios no esteroideos (TREY).  ¿Qué cuidados se necesitan en la etapa de " seguimiento?   El médico puede pedirle que visite el consultorio para evaluar meyers progreso. No falte a estas citas.  El médico puede referirlo a fisioterapia o a un quiropráctico para recibir tratamientos para aliviar el dolor y aprender los ejercicios adecuados para usted.  El médico también puede enviarlo a un neurólogo. Radha es un médico que se especializa en el tratamiento de problemas nerviosos.  Si no mejora con el tratamiento, es posible que el médico deba enviarlo a un cirujano ortopédico.  ¿Qué medicamentos pueden ser necesarios?   Es posible que el médico le recete medicamentos para lo siguiente:  Aliviar el dolor y la hinchazón  El médico le puede irina salud inyección de un medicamento antiinflamatorio denominado corticosteroide. [place] aliviará la hinchazón. Hable con meyers médico acerca de los riesgos de esta inyección.  ¿Estará restringida la actividad física?   Es posible que necesite descansar brenden un tiempo. No debe realizar actividades físicas que empeoren el problema de mango. Hable con meyers médico si corre, realiza ejercicios o practica deportes. Es posible que no pueda realizar estas actividades hasta que meyers problema de mango mejore.  ¿Qué problemas podrían surgir?   Dolor de espalda a angela plazo  Pérdida de sensación o movimiento en las piernas o los pies  Aumento de peso, menos fuerza y flexibilidad muscular, huesos más débiles  Necesidad de salud cirugía  Infección  Pérdida de la función intestinal y de la vejiga  ¿Cómo puede prevenirse radha problema de mango?   Manténgase activo y realice ejercicios para mantener los músculos bennie y flexibles. [place] lentamente y estire antes de hacer ejercicio.  Adopte salud buena postura.  Implemente maneras adecuadas de levantar peso y agacharse:  Separe los pies para que tenga salud buena base de apoyo. Luego, flexione las rodillas cuando recoja algo del suelo.  Al levantar y  un objeto, mantenga recta la espalda. Mantenga el objeto lo más cerca posible del  cuerpo. No tuerza la cintura. Más juan, mueva los pies hacia la dirección adonde vaya.  [place] descansos con frecuencia cuando esté sentado brenden períodos prolongados. Levántese y camine alrededor de vez en cuando.  Si pasa mucho tiempo de pie, suba un rato salud pierna en un banco bajo. Luego, cambie de pierna.  Si duerme de lado, póngase salud almohada entre las rodillas para mantener en buena posición la espalda y las piernas.  Use calzado de soporte de buena calidad. Evite usar zapatos de taco alto.  Mantenga un peso jay jay.  ¿Cuándo rene llamar al médico?   No puede caminar o empieza a tener problemas para controlar howard intestinos o meyers vejiga.  Tiene dolor, entumecimiento o debilidad nuevos o que empeoran y se extienden a ambas piernas.  [place] dolor empeora, incluso con medicamentos y descanso.  No puede realizar howard actividades normales debido al dolor.  Consejos útiles   El ejercicio acuático o el ciclismo pueden ayudarlo a mantenerse en forma sin empeorar meyers problema.  Los ejercicios adecuados para la ciática dependerán del problema que causa el dolor. Hable con el médico acerca de qué estiramientos son mejores para usted.  El estiramiento puede ser un poco doloroso, sophia nunca debe provocarle manan agudos. Si le resulta doloroso, relaje hasta sentir solo un estiramiento leve. Todos los ejercicios de estiramiento se deben mantener brenden 20 a 30 segundos para que resulten más útiles. Repita 2 o 3 veces. Repita cada ejercicio de 2 a 3 veces cada día para obtener mejores resultados.  Recuéstese boca arriba. Flexione la rodilla del lado doloroso hasta que el pie se encuentre a la altura de la otra rodilla. Sin elevar los hombros, lentamente deje caer la rodilla flexionada sobre la otra pierna. [place] esto hasta sentir la elongación en los glúteos.  Quede boca arriba con las rodillas dobladas y los pies sobre el piso. Si el problema se encuentra en la pierna derecha, cruce el tobillo derecho sobre el muslo darren,  olga arriba de la rodilla. Estire el brazo derecho entre los muslos y entrelace las maria elena alrededor del muslo darren. Poco a poco, lleve el muslo darren hacia el pecho hasta que sienta el estiramiento en el glúteo derecho.  Repita la enseñanza con howard propias palabras ([place]): Ayudándolo a comprender   El método de enseñanza recíproca le ayuda a comprender la información que le proporcionamos. Después de hablar con el personal, dígale en howard propias palabras lo que aprendió. [place] ayuda a asegurar que el personal haya descrito cada cosa con claridad. También ayuda a explicar cosas que pueden oli sido confusas. Antes de irse a casa, asegúrese de poder hacer lo siguiente:  Puedo hablarle sobre mi condición.  Decir qué ayuda a aliviar el dolor.  Decir qué haré en sujata de tener más dolor o entumecimiento en la pierna o el pie.  ¿Dónde puedo obtener más información?   American [employer]  http://orthoinfo.aaos.org/topic.cfm?[email or web address]   Exención de responsabilidad y uso de la información del consumidor   Esta información general es un resumen limitado de la información sobre el diagnóstico, el tratamiento y/o la medicación. No pretende ser exhaustivo y debe utilizarse oli salud herramienta para ayudar al usuario a comprender y/o evaluar las posibles opciones de diagnóstico y tratamiento. NO incluye toda la información sobre las enfermedades, los tratamientos, los medicamentos, los efectos secundarios o los riesgos que pueden aplicarse a un paciente específico. No tiene por objeto ser un consejo médico ni un sustituto del consejo médico. [place] pretende reemplazar al diagnóstico o el tratamiento proporcionados por un proveedor de atención médica con base en el examen y la evaluación por parte de radha proveedor de las circunstancias específicas y únicas de un paciente. Los pacientes deben hablar con un proveedor de atención médica para obtener información completa sobre meyers mango,  preguntas médicas y opciones de tratamiento, incluidos los riesgos o beneficios relacionados con el uso de medicamentos. Esta información no respalda ningún tratamiento o medicamento oli seguro, eficaz o aprobado para tratar a un paciente específico. [employer] y howard afiliados renuncian a cualquier garantía o responsabilidad relacionada con esta información o con el uso que se george de esta. El uso de esta información se rige por las Condiciones de uso, disponibles en [email or web address]://[email or web address]/en/know/clinical-effectiveness-terms   Copyright   Copyright © 2024 [employer] y howard licenciantes y/o afiliados. Todos los derechos reservados.    Patient Education     Ejercicios de elongación con flexión de la espalda   Acerca de radha mehul   El dolor de espalda es un problema común de los adultos. Distintos ejercicios pueden ayudar a disminuir el dolor. Un tipo de ejercicio que puede ayudar son los ejercicios de elongación con flexión de la espalda. Flexión de la espalda significa que la espalda se flexiona hacia adelante. Según la causa de meyers dolor de espalda, estos ejercicios podrían empeorar howard problemas de columna.  General   Antes de comenzar con un programa, consulte a meyers médico para saber si está lo suficientemente jay jay oli para hacer estos ejercicios. Es posible que el médico le pida que trabaje con un entrenador o fisioterapeuta para elaborar un programa seguro de actividad física que cumpla con howard necesidades.  Ejercicios de estiramiento   Los ejercicios de estiramiento mantienen los músculos flexibles. También evitan que se endurezcan. Para comenzar, realice estos estiramientos 2 o 3 veces. Para que meyers cuerpo produzca cambios, deberá sostener estos estiramientos brenden 20 a 30 segundos. Realice estos estiramientos 2 o 3 veces al día. [place] todos los ejercicios de forma lenta.  Elongaciones simples desde las rodillas al pecho: recuéstese sobre la espalda. Lleve salud rodilla hacia el pecho hasta que  sienta el estiramiento en la parte baja de la espalda y el área de los glúteos. Repita con la otra rodilla. Si tiene problemas de rodilla, tire de la rodilla sosteniendo la parte posterior del muslo en lugar de walt la parte frontal de la rodilla. También puede hacer radha ejercicio tomando ambas rodillas al mismo tiempo.  Elongaciones profundas de cadera recostado: recuéstese sobre la espalda y flexione salud rodilla, manteniendo karri pie plano sobre el piso. Cruce la otra pierna sobre la rodilla. Lleve la pierna de abajo hacia el pecho hasta que sienta el estiramiento en el otro glúteo. Repita el ejercicio con la otra pierna abajo.  Elongaciones del tendón de la corva sentado: siéntese recto en el borde de salud silla. Asegúrese de mantener la espalda recta. Estire la rodilla de la pierna izquierda. Mantenga el talón en el suelo. Inclínese hacia adelante a la altura de la cintura hacia el pie, manteniendo la parte superior de la espalda recta. Inclínese hacia adelante hasta que sienta la elongación en la parte posterior del muslo. Repita con la otra pierna.  Estiramientos de la espalda baja sentado: siéntese en salud silla con los pies separados a la altura de los hombros. Luego, inclínese hacia adelante hasta sentir el estiramiento en la espalda baja. Es posible que deba estirarse entre las piernas para sentir más estiramiento.  Elongaciones frontales de la cadera de rodillas o elongaciones del flexor de la cadera: arrodíllese sobre salud pierna. Inclínese hacia adelante sobre la pierna de adelante mientras empuja la pierna de atrás hacia atrás. Hágalo hasta que sienta salud elongación en la parte anterior de la cadera de la pierna de atrás. Repita con el lado contrario.  Tocar el pie contrario en posición de pie: párese con los pies con salud separación un poco mayor a la de los hombros. Estire los brazos alejándolos de los costados. Doble la cintura hacia adelante y estire la mano derecha hacia el pie darren. El otro  brazo se estirará detrás de usted hacia arriba. Mantenga los brazos y las piernas rectas. Ahora, párese nuevamente y repita con la mano izquierda estirándose hacia el pie derecho.               ¿Cuáles serán los resultados?   Mejor flexibilidad  Menos rigidez  Menos dolor de espalda  Menos espasmos de espalda  Mayor facilidad para caminar y realizar otras actividades  Menos dolor de piernas, entumecimiento y hormigueo  Consejos útiles   Manténgase activa y realice ejercicios para mantener los músculos bennie y flexibles.  Mantenga un peso saludable y evite someter la columna a demasiado esfuerzo. Siga salud dieta saludable para mantener howard músculos sanos.  No contenga la respiración cuando realice actividad física. [place] aumentará la presión arterial. Si usted suele hacer esto, pruebe contar en voz nick mientras hace ejercicio. Si algún ejercicio le general malestar, deje de hacerlo inmediatamente.  Siempre george un precalentamiento antes de elongar. Los músculos calientes se estiran más fácil que los fríos. Estirar músculos fríos puede causar lesiones.  Intente caminar o balancear los brazos a un ritmo lento brenden algunos minutos para calentar los músculos. [place] esto luego de ejercitar.  No rebote cuando elongue.  Ejercitarse antes de cada comida es salud buena manera de mantener salud rutina.  Es posible que se sienta algo incómodo cuando george ejercicio, sophia no debería sentir un dolor intenso. Si siente un dolor intenso, deje lo que está haciendo. Si el dolor continúa, llame al médico.  ¿Dónde puedo obtener más información?   American Academy [employer] Orth[employer]geons  http://orthoinfo.aaos.[email or web address].[email or web address]   Exención de responsabilidad y uso de la información del consumidor   Esta información general es un resumen limitado de la información sobre el diagnóstico, el tratamiento y/o la medicación. No pretende ser exhaustivo y debe utilizarse oli salud herramienta para ayudar al usuario a comprender y/o evaluar  las posibles opciones de diagnóstico y tratamiento. NO incluye toda la información sobre las enfermedades, los tratamientos, los medicamentos, los efectos secundarios o los riesgos que pueden aplicarse a un paciente específico. No tiene por objeto ser un consejo médico ni un sustituto del consejo médico. [place] pretende reemplazar al diagnóstico o el tratamiento proporcionados por un proveedor de atención médica con base en el examen y la evaluación por parte de radha proveedor de las circunstancias específicas y únicas de un paciente. Los pacientes deben hablar con un proveedor de atención médica para obtener información completa sobre meyers mango, preguntas médicas y opciones de tratamiento, incluidos los riesgos o beneficios relacionados con el uso de medicamentos. Esta información no respalda ningún tratamiento o medicamento oli seguro, eficaz o aprobado para tratar a un paciente específico. [employer] y howard afiliados renuncian a cualquier garantía o responsabilidad relacionada con esta información o con el uso que se zora de esta. El uso de esta información se rige por las Condiciones de uso, disponibles en https://www.Mygenier.com/en/know/clinical-effectiveness-terms   Copyright   Copyright © 2024 [employer] y howard licenciantes y/o afiliados. Todos los derechos reservados.    Patient Education     Elongaciones de espalda en el suelo   Acerca de radha mehul   Es importante mantener los músculos de la espalda flexibles. Los ejercicios de elongación pueden ayudar a aliviar el dolor y la rigidez, aumentar la flexibilidad y hacer que howard actividades diarias kvng más fáciles.  General   Antes de comenzar con un programa, consulte a meyers médico para saber si está lo suficientemente jay jay oli para hacer estos ejercicios. Es posible que el médico le pida que trabaje con un entrenador o fisioterapeuta para elaborar un programa seguro de actividad física que cumpla con howard necesidades.  Ejercicios de estiramiento    Los ejercicios de estiramiento mantienen los músculos flexibles. También evitan que se endurezcan. Para comenzar, realice estos estiramientos 2 o 3 veces. Para que meyers cuerpo produzca cambios, deberá sostener estos estiramientos brenden 20 a 30 segundos. Realice estos estiramientos 2 o 3 veces al día. [place] todos los ejercicios de forma lenta.  Elongaciones simples desde las rodillas al pecho: recuéstese sobre la espalda. Lleve salud rodilla hacia el pecho hasta que sienta el estiramiento en la parte baja de la espalda y el área de los glúteos. Repita con la otra rodilla. Si tiene problemas de rodilla, tire de la rodilla sosteniendo la parte posterior del muslo en lugar de walt la parte frontal de la rodilla. También puede hacer radha ejercicio tomando ambas rodillas al mismo tiempo.  Elongaciones profundas de cadera recostado: recuéstese sobre la espalda y flexione salud rodilla, manteniendo karri pie plano sobre el piso. Cruce la otra pierna sobre la rodilla. Poco a poco, lleve la pierna de abajo hacia el pecho hasta que sienta el estiramiento en el otro glúteo. Repita el ejercicio con la otra pierna abajo.  Apoyo sobre los codos boca abajo: acuéstese boca abajo, apoyándose sobre el antebrazo. Eleve los codos tan alto oli pueda. Mantenga la cadera en el piso. Luego, baje la espalda y los hombros.  Elongaciones con la espalda redondeada: para comenzar, colóquese sobre las cuatro extremidades. Mueva el mentón hacia adentro y apriete los músculos del abdomen para redondear la espalda.  Rotaciones de espalda:  Elongación 1: recuéstese boca arriba. Flexione las rodillas para que un pie quede plano sobre la cama. Suavemente, empuje las rodillas hacia un lado hasta sentir la elongación en la parte baja de la espalda. Asegúrese de que ambos hombros toquen la cama hasta sentir la elongación de los músculos en los laterales de la espalda. Repita con el lado contrario.  Elongación 2: recuéstese boca arriba. Con los hombros  planos, coloque un muslo arriba y cruce el cuerpo hacia el lado opuesto. Con la mano aplique presión adicional para la elongación. Repita con el lado contrario.  Rotaciones de la espalda media: colóquese sobre las cuatro extremidades. Coloque las maria elena hacia un lado hasta que sienta salud buena elongación en el lado opuesto. Luego, coloque las maria elena hacia el otro lado y mantenga la posición. Ahora, siéntese sobre los talones. Coloque las maria elena hacia un lado hasta que sienta salud buena elongación en el lado opuesto. Luego, coloque las maria elena hacia el otro lado y mantenga la posición. Debe sentir la elongación en un área levemente diferente a la que sintió cuando estaba sobre las cuatro extremidades.               ¿Cuáles serán los resultados?   Mejor flexibilidad y amplitud de movimiento  Menos dolor de espalda  Menos tensión muscular  Menos espasmos de espalda  Menos entumecimiento y hormigueo de las piernas  Mayor facilidad para caminar y realizar otras actividades  Mejor postura  Mejor rendimiento deportivo  Consejos útiles   Manténgase activa y realice ejercicios para mantener los músculos bennie y flexibles.  Mantenga un peso saludable y evite someter la columna a demasiado esfuerzo. Siga salud dieta saludable para mantener howard músculos sanos.  No contenga la respiración cuando realice actividad física. [place] aumentará la presión arterial. Si usted suele hacer esto, pruebe contar en voz nick mientras hace ejercicio. Si algún ejercicio le general malestar, deje de hacerlo inmediatamente.  Siempre george un precalentamiento antes de elongar. Los músculos calientes se estiran más fácil que los fríos. Estirar músculos fríos puede causar lesiones.  Intente caminar o andar en bicicleta a un ritmo lento brenden algunos minutos para calentar los músculos. [place] esto luego de ejercitar.  No rebote cuando elongue.  Ejercitarse antes de cada comida es salud buena manera de mantener salud rutina.  Es posible que se sienta algo incómodo  cuando zora ejercicio, sophia no debería sentir un dolor intenso. Si siente un dolor intenso, deje lo que está haciendo. Si el dolor continúa, llame al médico.  ¿Dónde puedo obtener más información?   American Academy [employer]geons  http://orthoinfo.aaos.org/topic.cfm?[email or web address]   Exención de responsabilidad y uso de la información del consumidor   Esta información general es un resumen limitado de la información sobre el diagnóstico, el tratamiento y/o la medicación. No pretende ser exhaustivo y debe utilizarse oli salud herramienta para ayudar al usuario a comprender y/o evaluar las posibles opciones de diagnóstico y tratamiento. NO incluye toda la información sobre las enfermedades, los tratamientos, los medicamentos, los efectos secundarios o los riesgos que pueden aplicarse a un paciente específico. No tiene por objeto ser un consejo médico ni un sustituto del consejo médico. [place] pretende reemplazar al diagnóstico o el tratamiento proporcionados por un proveedor de atención médica con base en el examen y la evaluación por parte de radha proveedor de las circunstancias específicas y únicas de un paciente. Los pacientes deben hablar con un proveedor de atención médica para obtener información completa sobre meyers mango, preguntas médicas y opciones de tratamiento, incluidos los riesgos o beneficios relacionados con el uso de medicamentos. Esta información no respalda ningún tratamiento o medicamento oli seguro, eficaz o aprobado para tratar a un paciente específico. [employer] y howard afiliados renuncian a cualquier garantía o responsabilidad relacionada con esta información o con el uso que se zora de esta. El uso de esta información se rige por las Condiciones de uso, disponibles en https://www.Cognuseer.com/en/know/clinical-effectiveness-terms   Copyright   Copyright © 2024 [facility] y howard licenciantes y/o afiliados. Todos los derechos reservados.    Patient Education     Ejercicios para  "el dolor de ciática   Conceptos Básicos   Redactado por los médicos y editores de [employer]   ¿Qué es la ciática? -- El nervio ciático es un nervio alice que comienza en la parte baja de la espalda. Corre por la parte posterior de la pierna.  Algo oli un disco o un espolón puede pellizcar o dañar el nervio ciático. Los músculos tensos e inflamados también pueden pellizcarlo o dañarlo. [place] puede causar dolor, debilidad, adormecimiento u hormigueo que va desde la nalga pasando por la pierna hacia el talón. Cuando aparecen estos síntomas, la gente suele llamarlo \"ciática\". El nombre médico para esto es \"radiculopatía\".  Puede tener dolor de ciática en un lado o en ambos. La mayoría de las veces mejora sin cirugía.  ¿Por qué necesito hacer ejercicios si tengo ciática? -- Los ejercicios de estiramiento y fortalecimiento pueden ayudar a aliviar el dolor de espalda. También podría ayudar a prevenir futuros manan de espalda. A angela plazo, es importante fortalecer los músculos de la parte baja de la espalda, las nalgas y el área del estómago. Estos son howard \"músculos troncales\". Los ejercicios de estiramiento también son importantes para mantener los músculos flexibles.  A continuación se muestran algunos ejercicios de estiramiento y fortalecimiento que podrían ayudarle. Otras formas de movimiento también pueden ayudar a aliviar o prevenir el dolor de espalda. Por ejemplo, a algunas personas les gusta caminar o hacer ejercicio aeróbico, yoga o arsh chi. Lo más importante es  el cuerpo. Cruz médico, enfermero o fisioterapeuta puede ayudarle a encontrar diferentes tipos de actividad que le margot juan.  ¿Qué ejercicios de estiramiento rene hacer? -- Ramona howard músculos antes de estirar. [place] ayuda a prevenir lesiones. Para calentar, puede caminar, trotar o andar en bicicleta. A continuación se muestran algunos ejemplos de ejercicios de estiramiento.  Comience repitiendo cada kellen de estos estiramientos de 2 a 3 veces. " Para que haya cambios en meyers cuerpo, intente mantener cada estiramiento brenden 5 a 10 segundos. Intente hacer los estiramientos 2 o 3 veces al día. Respire lenta y profundamente mientras hace los ejercicios. Nunca  rebote al hacer estiramientos.    Estiramientos simples de rodilla a pecho (figura 1) ? Mientras se acuesta boca arriba, flexione las rodillas con los pies apoyados en el suelo. Tire de salud rodilla hacia el pecho hasta que sienta un estiramiento en la parte baja de la espalda y en el área de los glúteos. Baje y repita con la otra rodilla. Si tiene problemas de rodilla, levante la rodilla agarrando la parte posterior del muslo en lugar de la parte delantera de la rodilla. También puede hacer radha ejercicio agarrando ambas rodillas al mismo tiempo.   Estiramientos profundos de cadera acostado (figura 2) ? Acuéstese boca arriba y flexione salud rodilla, manteniendo el pie apoyado en el suelo. Cruce la otra pierna sobre meyers rodilla. Agarre el muslo de la pierna que tiene el pie en el suelo. Lentamente, jale la pierna de abajo hacia meyers pecho hasta que sienta un estiramiento en la otra nalga. Repita el proceso usando la pierna opuesta oli pierna inferior.   Estiramientos profundos de cadera sentado (figura 3) ? Siéntese en el suelo con ambas piernas estiradas. [place] salud pierna y crúcela sobre la otra pierna de modo que el tobillo o el pie de la pierna superior quede al lado de la otra rodilla. Ahora, tome el codo del lado opuesto a meyers rodilla flexionada y llévelo hacia el exterior de la rodilla flexionada. Con el codo, empuje lentamente la rodilla flexionada hacia el otro lado del cuerpo para conseguir un buen estiramiento de la cadera.   Flexiones de tronco (figura 4) - Empiece sobre ayse maria elena y rodillas. Ayse maria elena deben estar directamente debajo de ayse hombros. Ayse rodillas deben estar ligeramente separadas y directamente debajo de ayse caderas. Estire lentamente la espalda mientras lleva las caderas hacia los  tobillos. Ayse brazos están extendidos hacia adelante en salud posición relajada, mientras la parte superior de meyers cuerpo se hunde hacia el suelo.  ¿Qué ejercicios de fortalecimiento rene hacer? -- A continuación se muestran algunos ejemplos de ejercicios de fortalecimiento.  Empiece por hacer cada ejercicio 2 o 3 veces. Trabaje hasta hacer cada ejercicio 10 veces. Mantenga cada ejercicio de 3 a 5 segundos. Intente hacer los ejercicios 2 o 3 veces al día. Zora todos los ejercicios lentamente.   Inclinaciones pélvicas (figura 5) ? Acuéstese boca arriba con las rodillas flexionadas y los pies apoyados en el suelo. Respire lenta y profundamente. Presione la parte inferior de meyers espalda hacia el suelo. Contraiga los músculos del estómago mientras respira profunda y lentamente, luego relájese.   Levantamientos de cadera (figura 6) ? Acuéstese boca arriba con las rodillas flexionadas y los pies apoyados en el suelo. Respire profunda y lentamente. Contraiga los músculos del estómago, mantenga la espalda plana y levante las nalgas del suelo. Relaje. Debería sentir esto en ayse nalgas, no en meyers espalda baja.  ¿Qué más rene saber?    El ejercicio, incluido el estiramiento, puede resultar un poco incómodo, sophia no debe sentir un dolor larry o intenso. Si siente un dolor intenso, deje de hacer lo que está haciendo. Si el dolor intenso continúa, llame a meyers médico o enfermero.   No contenga la respiración cuando zora ejercicio. Si tiende a contener la respiración, intente contar en voz nick cuando zora ejercicio.   Siempre caliente los músculos antes de hacer ejercicio. Estirar antes del calentamiento puede provocar lesiones.   Hacer ejercicios antes de salud comida puede ayudarle a adquirir salud rutina.  Todos los artículos se actualizan a medida que se descubre nueva evidencia y culmina nuestro proceso de evaluación por homólogos   Keena artículo se recuperó de UpToDate el: May 15, 2024.  Artículo [id number] Versión [id number].[id number]  Release:  32.4.3 - C32.134  © 2024 [facility] Todos los derechos reservados.  figura 1: Estiramiento simple de rodilla a pecho     Acuéstese boca arriba con las rodillas dobladas y apoye los pies en el suelo. Tire de salud rodilla hacia el pecho hasta que sienta un estiramiento en la parte baja de la espalda y en el área de los glúteos. Repita con la otra rodilla. Si tiene problemas de rodilla, levante la rodilla agarrando la parte posterior del muslo en lugar de la parte delantera de la rodilla. También puede hacer radha ejercicio agarrando ambas rodillas al mismo tiempo.  Gráfico [id number] Versión 1.0  figura 2: Estiramientos profundos de cadera acostado     Acuéstese boca arriba y flexione salud rodilla, manteniendo el pie apoyado en el suelo. Cruce la otra pierna sobre meyers rodilla. Agarre el muslo de la pierna que tiene el pie en el suelo. Lentamente, jale la pierna de abajo hacia meyers pecho hasta que sienta un estiramiento en la otra nalga. Repita el proceso usando la pierna opuesta oli pierna inferior.  Gráfico [id number] Versión 1.0  figura 3: Estiramiento profundo de cadera sentado     Siéntese en el suelo con ambas piernas estiradas. [place] salud pierna y crúcela sobre la otra pierna de modo que el pie de la pierna superior quede al lado de la parte externa de la rodilla. Ahora, tome el codo del lado opuesto a meyers rodilla flexionada y llévelo hacia la parte externa de la rodilla flexionada. Con el codo, empuje lentamente la rodilla flexionada hacia el otro lado del cuerpo para conseguir un buen estiramiento de la cadera.  [facility] [id number] Versión 1.0  figura 4: Flexiones de tronco     Empiece sobre ayse maria elena y rodillas. Ayse maria elena deben estar directamente debajo de ayse hombros. Ayse rodillas deben estar ligeramente separadas y directamente debajo de ayse caderas. Estire lentamente la espalda mientras lleva las caderas hacia los tobillos. Ayse brazos deben estar extendidos hacia adelante en salud posición relajada, mientras la parte  superior de meyers cuerpo se hunde hacia el suelo.  Gráfico [id number] Versión 1.0  figura 5: Inclinaciones pélvicas     Acuéstese boca arriba con las rodillas dobladas y los pies apoyados en el suelo. [place] los músculos del estómago y presione la parte baja de la espalda hacia el suelo. Relaje.  Gráfico [id number] Versión 1.0  figura 6: Levantamientos de cadera     Acuéstese boca arriba con las rodillas dobladas y los pies apoyados en el suelo. Apriete los músculos del estómago y levante las nalgas del suelo. Relaje.  Gráfico [id number] Versión 1.0  Exención de responsabilidad y uso de la información del consumidor   Descargo de responsabilidad: esta información generalizada es un resumen limitado de información sobre el diagnóstico, el tratamiento y/o los medicamentos. No pretende ser exhaustiva y se debe utilizar oli herramienta para ayudar al usuario a comprender y/o evaluar las posibles opciones de diagnóstico y tratamiento. No incluye toda la información sobre afecciones, tratamientos, medicamentos, efectos secundarios o riesgos puedan ser aplicables a un paciente específico. No tiene el propósito de servir oli recomendación médica ni de sustituir la recomendación médica, el diagnóstico o el tratamiento de un profesional de atención médica que se base en el examen y la evaluación de radha profesional de la mango respecto a las circunstancias específicas y únicas del paciente. Los pacientes deben hablar con un profesional de atención médica para obtener información completa sobre meyers mango, cuestiones médicas y opciones de tratamiento, incluidos los riesgos o los beneficios relacionados con el uso de medicamentos. Esta información no certifica que los tratamientos o medicamentos kvng seguros, eficaces o estén aprobados para tratar a un paciente específico. [employer] Inc. y howard afiliados renuncian a cualquier garantía o responsabilidad relacionada con esta información o el uso de la misma.El uso de esta información está  sujeto a las Condiciones de uso, disponibles en https://www.Eyefreighter.com/en/know/clinical-effectiveness-terms. 2024© [facility] y howard afiliados y/o licenciantes. Todos los derechos reservados.  Copyright   © 2024 [employer] Todos los derechos reservados.    Patient Education     Ejercicios de estiramiento para la parte inferior del cuerpo   Acerca de radha mehul   El estiramiento es un tipo de ejercicio. Al estirarse, se extiende un músculo o caterina de músculos. La elongación tiene muchos beneficios. Luego de estirarse podrá moverse con más facilidad. También será más flexible. Al estirarse, el flujo sanguíneo a los músculos aumenta. Prepara a los músculos para otros ejercicios. El estiramiento también ayuda a relajar o evitar lesiones musculares.  General   Antes de comenzar con un programa, consulte a meyers médico para saber si está lo suficientemente jay jay oli para hacer estos ejercicios. Es posible que el médico le pida que trabaje con un entrenador, quiropráctico o fisioterapeuta para elaborar un programa seguro de actividad física que cumpla con howard necesidades.  Ejercicios de estiramiento   Los ejercicios de estiramiento mantienen los músculos flexibles. También evitan que se endurezcan. Para comenzar, realice estos estiramientos 2 o 3 veces. Para que se produzcan cambios en el cuerpo, deberá sostener estos estiramientos brenden 20 a 30 segundos. Realice estos estiramientos 2 o 3 veces al día. George todos los ejercicios de forma lenta.  Si tiene problemas de equilibrio, no intente estirar estando parado. Hay otras maneras seguras de estirar varios músculos sentado o recostado.  Elongaciones simples desde las rodillas al pecho: recuéstese sobre la espalda. Lleve salud rodilla hacia el pecho hasta que sienta el estiramiento en la parte baja de la espalda y el área de los glúteos. Repita con la otra rodilla. Si tiene problemas de rodilla, tire de la rodilla sosteniendo la parte posterior del muslo en lugar  de walt la parte frontal de la rodilla. También puede hacer radha ejercicio tomando ambas rodillas al mismo tiempo.  Rotaciones de la parte baja del tronco: mientras está boca arriba, flexione las rodillas y deje las plantas de los pies sobre el suelo. Mantenga las piernas juntas y luego déjelas caer a un lado. Asegúrese de que ambos hombros toquen el suelo hasta sentir el estiramiento de los músculos en los laterales de la espalda. Repita con el lado contrario.  Elongaciones del flexor de la cadera: recuéstese sobre el costado derecho de la cama. Cuelgue la pierna derecha por el borde de la cama. Sujete la pierna izquierda y jale la rodilla izquierda hacia el pecho. Ahora, flexione la rodilla derecha hacia atrás hasta que sienta la elongación en la parte frontal del muslo y la cadera. Cambie de posición en la cama y repita con la otra pierna.  Elongaciones del tendón de la corva sentado: siéntese recto en el borde de salud silla. Asegúrese de mantener la espalda recta. Estire la rodilla de la pierna izquierda. Mantenga el talón en el suelo. Inclínese hacia adelante a la altura de la cintura hacia el pie manteniendo la parte superior de la espalda recta. Inclínese hacia adelante hasta que sienta la elongación en la parte posterior del muslo. Repita con la otra pierna.  Elongaciones de muslo de pie: párese cerca de salud pared o silla para mantener el equilibrio. Flexione salud rodilla y tome el pie detrás de usted con la mano del mismo lado. Lleve el pie lo más cerca posible de la espalda mientras lleva la cadera hacia atrás. Debe sentir la elongación en la parte anterior del muslo, la cadera y la rodilla.  Estiramientos de pantorrilla de pie: párese a unos 30 a 45 cm (12 o 18 pulgadas) de distancia de salud pared. Coloque las maria elena en la pared a la altura del hombro. Inclínese hacia adelante.  Rodilla recta: estire la pierna izquierda detrás de usted. Asegúrese de que el talón darren esté completamente apoyado sobre el  suelo y la rodilla izquierda esté recta. Ahora, doble la rodilla de la pierna derecha hasta que sienta un estiramiento en la pantorrilla izquierda. Asegúrese de que el talón no se eleve. Repita con el lado contrario.  Rodilla doblada: dé un pequeño paso hacia adelante con la pierna izquierda para que howard pies estén un poco más juntos. Con la pierna derecha doblada, flexione la rodilla izquierda hacia adelante hasta que sienta un estiramiento en la parte posterior de la pantorrilla de la pierna izquierda. Puede tener salud sensación extraña, sophia es la mejor manera para estirar radha músculo de la pantorrilla. Repita con el lado contrario.               ¿Cuáles serán los resultados?   Más flexible  Mejor postura  Evitar salud lesión  Disminuir el estrés  Mayor facilidad para caminar y realizar otras actividades  Consejos útiles   Manténgase activa y realice ejercicios para mantener los músculos bennie y flexibles.  Conserve un peso saludable para evitar un estrés mayor sobre las articulaciones. Siga salud dieta saludable para mantener howard músculos sanos.  No contenga la respiración cuando realice actividad física. Huey aumentará la presión arterial. Si usted suele hacer esto, pruebe contar en voz nick mientras hace ejercicio. Si algún ejercicio le general malestar, deje de hacerlo inmediatamente.  Siempre george un precalentamiento antes de elongar. Los músculos calientes se estiran más fácil que los fríos. Estirar músculos fríos puede causar lesiones.  Intente caminar o andar en bicicleta a un ritmo lento brenden algunos minutos para calentar los músculos. George esto luego de ejercitar.  No rebote cuando elongue.  Ejercitarse antes de cada comida es salud buena manera de mantener salud rutina.  Es posible que se sienta algo incómodo cuando george ejercicio, sophia no debería sentir un dolor intenso. Si siente un dolor intenso, deje lo que está haciendo. Si el dolor continúa, llame al médico.  Exención de responsabilidad y uso de la  información del consumidor   Esta información general es un resumen limitado de la información sobre el diagnóstico, el tratamiento y/o la medicación. No pretende ser exhaustivo y debe utilizarse oli salud herramienta para ayudar al usuario a comprender y/o evaluar las posibles opciones de diagnóstico y tratamiento. NO incluye toda la información sobre las enfermedades, los tratamientos, los medicamentos, los efectos secundarios o los riesgos que pueden aplicarse a un paciente específico. No tiene por objeto ser un consejo médico ni un sustituto del consejo médico. [place] pretende reemplazar al diagnóstico o el tratamiento proporcionados por un proveedor de atención médica con base en el examen y la evaluación por parte de radha proveedor de las circunstancias específicas y únicas de un paciente. Los pacientes deben hablar con un proveedor de atención médica para obtener información completa sobre meyers mango, preguntas médicas y opciones de tratamiento, incluidos los riesgos o beneficios relacionados con el uso de medicamentos. Esta información no respalda ningún tratamiento o medicamento oli seguro, eficaz o aprobado para tratar a un paciente específico. [employer] Inc. y howard afiliados renuncian a cualquier garantía o responsabilidad relacionada con esta información o con el uso que se zora de esta. El uso de esta información se rige por las Condiciones de uso, disponibles en https://www.[employer].com/en/know/clinical-effectiveness-terms   Copyright   Copyright © 2024 [employer] Inc. y howard licenciantes y/o afiliados. Todos los derechos reservados.

## 2024-07-12 PROBLEM — Z23 ENCOUNTER FOR IMMUNIZATION: Status: ACTIVE | Noted: 2024-07-12

## 2024-07-12 PROBLEM — M54.40 BACK PAIN OF LUMBAR REGION WITH SCIATICA: Status: ACTIVE | Noted: 2024-07-12

## 2024-07-12 NOTE — ASSESSMENT & PLAN NOTE
Lab Results   Component Value Date    HGBA1C 6.4 07/11/2024     Currently at goal for A1c (<7%, <8% if >80)  Continue current regimen of diet control  Continue Ace-i/arb  IS on statin, continue/consider adding            repeat lipids q3yrs, due asap  IS following optho, next eye exam due 2024, encouraged f/u  NOT following podiatry, next foot exam due 3/2025  yearly micro albumin creatinine ratio is not UTD, due asap  Carb controlled diet, discussed healthy lifestyle modifications  consider DM nutrition referral

## 2024-07-12 NOTE — ASSESSMENT & PLAN NOTE
Muscle strain vs radiculopathy vs stress fx vs spinal stenosis vs sciatica/piriformis syndrome vs arthritis/DJD    Encouraged PT, patient referral via free clinic  Encouraged exercise as tolerated, particularly walking   Handout provided  Consider robaxin vs flexeril vs short steroid course if no improvement  Encouraged plenty of fluids and ice pack/heat packs in addition to peppermint oil for muscle relaxation  No reg flag symptoms at this time  Discussed red flag symptoms of bowel and bladder incontinence, which would require immediate ED visit  XRAys r/o fracture or arthritis  Will likely need MRI if no improvement 6 weeks with conservative treatment  Med rec completed and compliant with regimen  Consider Arthritis workup if refractory   May consider sacroiliitis HLA b27 testing in future based on imaging or clinical course   May also consider short course of steroids  deferring referral to comprehensive spine at this time as patient has limited transportation   Declines lantavan application

## 2024-07-20 DIAGNOSIS — M25.512 CHRONIC LEFT SHOULDER PAIN: ICD-10-CM

## 2024-07-20 DIAGNOSIS — G89.29 CHRONIC LEFT SHOULDER PAIN: ICD-10-CM

## 2024-09-12 DIAGNOSIS — G89.29 CHRONIC LEFT SHOULDER PAIN: ICD-10-CM

## 2024-09-12 DIAGNOSIS — M25.512 CHRONIC LEFT SHOULDER PAIN: ICD-10-CM

## 2024-10-17 ENCOUNTER — OFFICE VISIT (OUTPATIENT)
Dept: INTERNAL MEDICINE CLINIC | Facility: CLINIC | Age: 64
End: 2024-10-17

## 2024-10-17 VITALS
SYSTOLIC BLOOD PRESSURE: 124 MMHG | HEART RATE: 84 BPM | WEIGHT: 127.6 LBS | BODY MASS INDEX: 21.9 KG/M2 | TEMPERATURE: 98.6 F | DIASTOLIC BLOOD PRESSURE: 69 MMHG | OXYGEN SATURATION: 98 %

## 2024-10-17 DIAGNOSIS — M54.40 BACK PAIN OF LUMBAR REGION WITH SCIATICA: ICD-10-CM

## 2024-10-17 DIAGNOSIS — J06.9 VIRAL URI: ICD-10-CM

## 2024-10-17 DIAGNOSIS — J15.9 BACTERIAL PNEUMONIA: Primary | ICD-10-CM

## 2024-10-17 PROCEDURE — G2211 COMPLEX E/M VISIT ADD ON: HCPCS | Performed by: STUDENT IN AN ORGANIZED HEALTH CARE EDUCATION/TRAINING PROGRAM

## 2024-10-17 PROCEDURE — 99214 OFFICE O/P EST MOD 30 MIN: CPT | Performed by: STUDENT IN AN ORGANIZED HEALTH CARE EDUCATION/TRAINING PROGRAM

## 2024-10-17 PROCEDURE — 3078F DIAST BP <80 MM HG: CPT | Performed by: STUDENT IN AN ORGANIZED HEALTH CARE EDUCATION/TRAINING PROGRAM

## 2024-10-17 PROCEDURE — 3074F SYST BP LT 130 MM HG: CPT | Performed by: STUDENT IN AN ORGANIZED HEALTH CARE EDUCATION/TRAINING PROGRAM

## 2024-10-17 RX ORDER — AZITHROMYCIN 250 MG/1
TABLET, FILM COATED ORAL
Qty: 6 TABLET | Refills: 0 | Status: SHIPPED | OUTPATIENT
Start: 2024-10-17 | End: 2024-10-22

## 2024-10-17 RX ORDER — GUAIFENESIN 200 MG/10ML
200 LIQUID ORAL 3 TIMES DAILY PRN
Qty: 120 ML | Refills: 0 | Status: SHIPPED | OUTPATIENT
Start: 2024-10-17

## 2024-10-17 RX ORDER — LIDOCAINE 40 MG/G
CREAM TOPICAL AS NEEDED
Qty: 28 G | Refills: 3 | Status: SHIPPED | OUTPATIENT
Start: 2024-10-17

## 2024-10-17 RX ORDER — ALBUTEROL SULFATE 90 UG/1
2 INHALANT RESPIRATORY (INHALATION) EVERY 6 HOURS PRN
Qty: 8.5 G | Refills: 1 | Status: SHIPPED | OUTPATIENT
Start: 2024-10-17

## 2024-10-17 NOTE — PROGRESS NOTES
Ambulatory Visit  Name: Ingrid Sanches      : 1960      MRN: [id number]  Encounter Provider: Raquel Couch DO  Encounter Date: 10/17/2024   Enc[facility]    Assessment & Plan  Back pain of lumbar region with sciatica    Orders:    lidocaine (LMX) 4 % cream; Apply topically as needed for mild pain    Viral URI  Expectant management with fluids and conservative measures  Orders:    albuterol (ProAir HFA) 90 mcg/act inhaler; Inhale 2 puffs every 6 (six) hours as needed for wheezing    guaiFENesin (ROBITUSSIN) 100 MG/5ML oral liquid; Take 10 mL (200 mg total) by mouth 3 (three) times a day as needed for cough    Bacterial pneumonia   Likely superimposed on viral URI  +wheezes and rhonchi, though moving air well  Conservative management as noted under viral URI  In setting of COPD, elected macrolide in hopes to elicit some anti-inflammatory properties   If no or minimal improvement, consider sputum culture and Augmentin trial after  Discussed side effects, risks, and benefits, which patient able to teach back.   ER precautions given  F/u 1 week to recheck symptoms, if needed    Orders:    XR chest pa and lateral; Future    azithromycin (Zithromax) 250 mg tablet; Take 2 tablets (500 mg total) by mouth daily for 1 day, THEN 1 tablet (250 mg total) daily for 4 days.         History of Present Illness     past medical history of type 2 diabetes controlled with diet, tobacco use disorder in remission, hypertension, HLD, non-ruptured cerebral anneurysm  requiring yearly CTA head presents today for complaints of congestion and cough for 5+ days. Initially, she was getting better, but has now felt worse and cough is nearly constant. No known sick contacts and is trying to social distance.    Home logs regarding recheck HTN demonstrate systolic numbers 120's, rarely low 130's. Continues healthy diet and exercise  Regarding back pain, patient feeling better  with conservative treatment particularly lidocaine and Voltaren. Did not attend free PT, and did not go for XR    Review of Systems   Constitutional:  Positive for appetite change and fatigue. Negative for chills and fever.   HENT:  Positive for congestion and sinus pressure.    Respiratory:  Positive for cough and chest tightness.    Cardiovascular:  Negative for chest pain.   Gastrointestinal:  Negative for nausea and vomiting.   Neurological:  Negative for dizziness, light-headedness and headaches.     Past Medical History:   Diagnosis Date    Diabetes mellitus (HCC)     Hyperlipidemia     Hypertension      Past Surgical History:   Procedure Laterality Date    BREAST BIOPSY Left 2021    stereo    BREAST EXCISIONAL BIOPSY Left     8 yrs ago benign in [place]    BREAST SURGERY  2016    removal of cyst      SECTION      COLONOSCOPY  2021    MAMMO STEREOTACTIC BREAST BIOPSY LEFT (ALL INC) Left 2021    TUBAL LIGATION      US GUIDED BREAST BIOPSY RIGHT COMPLETE Right 2021     Family History   Problem Relation Age of Onset    Diabetes Mother     Diabetes Sister     Diabetes Sister     Diabetes Sister     No Known Problems Sister     Endometrial cancer Sister 58    No Known Problems Daughter     No Known Problems Maternal Grandmother     No Known Problems Maternal Grandfather     Diabetes Paternal Grandmother     Cancer Paternal Grandmother     Endometrial cancer Maternal Aunt         unsure of age    Cancer Maternal Aunt     No Known Problems Maternal Aunt     No Known Problems Maternal Aunt     No Known Problems Maternal Aunt     No Known Problems Maternal Aunt     No Known Problems Paternal Aunt     No Known Problems Paternal Aunt     No Known Problems Paternal Aunt     No Known Problems Paternal Aunt     No Known Problems Paternal Aunt     No Known Problems Paternal Aunt     No Known Problems Paternal Aunt     No Known Problems Son     No Known Problems Son      Social History      Tobacco Use    Smoking status: Every Day     Current packs/day: 0.00     Types: Cigarettes     Last attempt to quit: 1981     Years since quittin.8    Smokeless tobacco: Never    Tobacco comments:     Smokes about 4 cigarettes a day    Vaping Use    Vaping status: Never Used   Substance and Sexual Activity    Alcohol use: Not Currently     Comment: social    Drug use: Never    Sexual activity: Not Currently     Birth control/protection: None, Post-menopausal     Current Outpatient Medications on File Prior to Visit   Medication Sig    acetaminophen (TYLENOL) 500 mg tablet Take 500 mg by mouth every 6 (six) hours as needed for mild pain    amLODIPine (NORVASC) 5 mg tablet TAKE ONE TABLET BY MOUTH DAILY    cholecalciferol (VITAMIN D3) 1,000 units tablet TAKE 2 TABLETS BY MOUTH DAILY AS DIRECTED    Diclofenac Sodium (VOLTAREN) 1 % APPLY 4 G TOPICALLY 4 (FOUR) TIMES A DAY    docusate sodium (COLACE) 250 MG capsule Take 1 capsule (250 mg total) by mouth 2 (two) times a day    lisinopril (ZESTRIL) 20 mg tablet TAKE ONE TABLET BY MOUTH DAILY AS DIRECTED    rosuvastatin (CRESTOR) 40 MG tablet TAKE ONE TABLET BY MOUTH DAILY    urea (CARMOL) 40 % Apply topically daily Apply topically daily to affected areas on bilateral plantar foot.    [DISCONTINUED] gabapentin (NEURONTIN) 100 mg capsule Take 1 capsule (100 mg total) by mouth daily at bedtime    [DISCONTINUED] lidocaine (LMX) 4 % cream Apply topically as needed for mild pain     Allergies   Allergen Reactions    Nicoderm [Nicotine] Palpitations, Other (See Comments) and Chest Pain     Nicotine gum - nose bleeds     Immunization History   Administered Date(s) Administered    COVID-19 PFIZER VACCINE 0.3 ML IM 04/10/2021, 2021, 2022    Hep A, adult 10/28/2020    Hep B, adult 10/28/2020, 2020    Influenza, recombinant, quadrivalent,injectable, preservative free 2020, 2022, 2023    Pneumococcal Conjugate Vaccine 20-valent (Pcv20),  Polysace 08/02/2022    Pneumococcal Polysaccharide PPV23 03/15/2021    Tdap 10/28/2020     Objective     /69 (BP Location: Left arm, Patient Position: Sitting, Cuff Size: Standard)   Pulse 84   Temp 98.6 °F (37 °C) (Temporal)   Wt 57.9 kg (127 lb 9.6 oz)   SpO2 98%   BMI 21.90 kg/m²     Physical Exam  Constitutional:       General: She is not in acute distress.     Appearance: She is well-developed. She is ill-appearing.   HENT:      Head: Normocephalic and atraumatic.      Nose: Congestion present. No rhinorrhea.      Mouth/Throat:      Mouth: Mucous membranes are dry.      Pharynx: Posterior oropharyngeal erythema (mild porterior oropharynx) present. No oropharyngeal exudate.   Eyes:      General: No scleral icterus.     Conjunctiva/sclera: Conjunctivae normal.   Neck:      Thyroid: No thyromegaly.   Cardiovascular:      Rate and Rhythm: Normal rate and regular rhythm.      Heart sounds: Normal heart sounds. No murmur heard.     No friction rub. No gallop.   Pulmonary:      Effort: Pulmonary effort is normal. No respiratory distress.      Breath sounds: No stridor. Wheezing (b/l UL) and rhonchi (anterior lung fields and middle and lower lung fields) present. No rales.      Comments: + tactile fremitus on L  No egophony or whispered pectorliloquy  Abdominal:      General: Bowel sounds are normal. There is no distension.      Palpations: Abdomen is soft. There is no mass.      Tenderness: There is no abdominal tenderness. There is no guarding or rebound.   Musculoskeletal:         General: No deformity.      Cervical back: Neck supple.      Right lower leg: No edema.      Left lower leg: No edema.   Skin:     General: Skin is warm.      Capillary Refill: Capillary refill takes less than 2 seconds.      Findings: No erythema or rash.   Neurological:      Mental Status: She is alert and oriented to person, place, and time.   Psychiatric:         Behavior: Behavior normal.         Thought Content: Thought  content normal.         Judgment: Judgment normal.       Administrative Statements   I have spent a total time of 35 minutes in caring for this patient on the day of the visit/encounter including Prognosis, Risks and benefits of tx options, Instructions for management, Patient and family education, Importance of tx compliance, Risk factor reductions, Impressions, Counseling / Coordination of care, Documenting in the medical record, Reviewing / ordering tests, medicine, procedures  , Obtaining or reviewing history  , and Communicating with other healthcare professionals .

## 2024-10-17 NOTE — ASSESSMENT & PLAN NOTE
Likely superimposed on viral URI  +wheezes and rhonchi, though moving air well  Conservative management as noted under viral URI  In setting of COPD, elected macrolide in hopes to elicit some anti-inflammatory properties   If no or minimal improvement, consider sputum culture and Augmentin trial after  Discussed side effects, risks, and benefits, which patient able to teach back.   ER precautions given  F/u 1 week to recheck symptoms, if needed    Orders:    XR chest pa and lateral; Future    azithromycin (Zithromax) 250 mg tablet; Take 2 tablets (500 mg total) by mouth daily for 1 day, THEN 1 tablet (250 mg total) daily for 4 days.

## 2024-10-17 NOTE — ASSESSMENT & PLAN NOTE
Expectant management with fluids and conservative measures  Orders:    albuterol (ProAir HFA) 90 mcg/act inhaler; Inhale 2 puffs every 6 (six) hours as needed for wheezing    guaiFENesin (ROBITUSSIN) 100 MG/5ML oral liquid; Take 10 mL (200 mg total) by mouth 3 (three) times a day as needed for cough

## 2024-11-13 DIAGNOSIS — M25.512 CHRONIC LEFT SHOULDER PAIN: ICD-10-CM

## 2024-11-13 DIAGNOSIS — G89.29 CHRONIC LEFT SHOULDER PAIN: ICD-10-CM

## 2024-11-16 PROBLEM — J15.9 BACTERIAL PNEUMONIA: Status: RESOLVED | Noted: 2024-10-17 | Resolved: 2024-11-16

## 2024-11-16 PROBLEM — J06.9 VIRAL URI: Status: RESOLVED | Noted: 2024-10-17 | Resolved: 2024-11-16

## 2024-11-18 DIAGNOSIS — E78.5 HYPERLIPIDEMIA, UNSPECIFIED HYPERLIPIDEMIA TYPE: ICD-10-CM

## 2024-11-18 RX ORDER — ROSUVASTATIN CALCIUM 40 MG/1
40 TABLET, COATED ORAL DAILY
Qty: 90 TABLET | Refills: 3 | Status: SHIPPED | OUTPATIENT
Start: 2024-11-18

## 2024-12-09 DIAGNOSIS — J06.9 VIRAL URI: ICD-10-CM

## 2024-12-09 RX ORDER — ALBUTEROL SULFATE 90 UG/1
2 INHALANT RESPIRATORY (INHALATION) EVERY 6 HOURS PRN
Qty: 8.5 G | Refills: 1 | Status: SHIPPED | OUTPATIENT
Start: 2024-12-09

## 2024-12-26 DIAGNOSIS — M25.512 CHRONIC LEFT SHOULDER PAIN: ICD-10-CM

## 2024-12-26 DIAGNOSIS — G89.29 CHRONIC LEFT SHOULDER PAIN: ICD-10-CM

## 2024-12-28 DIAGNOSIS — E55.9 VITAMIN D DEFICIENCY: ICD-10-CM

## 2024-12-30 ENCOUNTER — OFFICE VISIT (OUTPATIENT)
Dept: DENTISTRY | Facility: CLINIC | Age: 64
End: 2024-12-30

## 2024-12-30 DIAGNOSIS — Z01.20 ENCOUNTER FOR DENTAL EXAMINATION: Primary | ICD-10-CM

## 2024-12-30 PROCEDURE — D0220 INTRAORAL - PERIAPICAL FIRST RADIOGRAPHIC IMAGE: HCPCS

## 2024-12-30 PROCEDURE — D0140 LIMITED ORAL EVALUATION - PROBLEM FOCUSED: HCPCS

## 2024-12-30 RX ORDER — CHOLECALCIFEROL (VITAMIN D3) 25 MCG
2000 TABLET ORAL DAILY
Qty: 180 TABLET | Refills: 3 | Status: SHIPPED | OUTPATIENT
Start: 2024-12-30

## 2024-12-30 RX ORDER — AMOXICILLIN 500 MG/1
500 CAPSULE ORAL EVERY 8 HOURS SCHEDULED
Qty: 21 CAPSULE | Refills: 0 | Status: SHIPPED | OUTPATIENT
Start: 2024-12-30 | End: 2025-01-06

## 2024-12-30 NOTE — PROGRESS NOTES
Limited Exam    Ingrid Sanches 64 y.o. female presents with self to [facility] for Limited exam  PMH reviewed, no changes, ASA II. Significant medical history: DMII, HT, leukocytosis,lymphocytosis. Significant allergies: nicoderm. Significant medications: NSF.    Chief complaint:  UL Pain    Consent:  Discussed that limited exam focuses on problem area, and same day tx is not guaranteed.  Patient explained to if they wish to have anything else evaluated, they need to return to the practice at which they are a patient of record or schedule a comprehensive exam afterwards.  Patient understands and consent was given by self via verbal consent.    Subjective history:    Onset: 1 week ago.   Provocation: Cold, Biting.   Quality: Sharp.   Region: UL.   Severity: 7/10.   Timing: comes and goes.    Objective clinical findings:   Oral cancer screening: normal.   Extraoral exam: no remarkable findings.  Intraoral exam:   #15 DL caries, palatal root is  from extent of caries  #16M caries     Radiographs: [facility] PA(s) - #15 .     Pulp testing:  #15 Cold: Exaggerated but nonlingering response; Percussion: Mild tenderness; Palpation: Normal.  #16 Cold: Exaggerated but nonlingering response; Percussion: Normal; Palpation: Normal.  Assessment:  #15 nonrestorable tooth, symptomatic apical periodontitis,  #16 caries    Plan:   #15 OS referral for ext  #16 re-evaluate after ext #15. Originally tx planned for ext, determined could be possibly saved with restoration    Referral(s): OMS for ext 15 .  Rx: None.  Comprehensive care disposition: Pt to return for periodic    Patient dismissed ambulatory and alert.    NV: Periodic/tx planning.    Attending: Dr. Jeffery checked radiographs .

## 2025-01-09 ENCOUNTER — TELEPHONE (OUTPATIENT)
Dept: DENTISTRY | Facility: CLINIC | Age: 65
End: 2025-01-09

## 2025-01-27 DIAGNOSIS — G89.29 CHRONIC LEFT SHOULDER PAIN: ICD-10-CM

## 2025-01-27 DIAGNOSIS — M25.512 CHRONIC LEFT SHOULDER PAIN: ICD-10-CM

## 2025-01-29 ENCOUNTER — TELEPHONE (OUTPATIENT)
Age: 65
End: 2025-01-29

## 2025-01-29 DIAGNOSIS — M54.40 BACK PAIN OF LUMBAR REGION WITH SCIATICA: ICD-10-CM

## 2025-01-29 DIAGNOSIS — Z01.812 PRE-PROCEDURE LAB EXAM: Primary | ICD-10-CM

## 2025-01-29 NOTE — TELEPHONE ENCOUNTER
ELZA FROM  LAB[facility] CALLED REQUESTING A BUN/CREATININE ORDER FOR PT BE UPDATED FOR UPCOMING CTA 2/14/2025 FOR SNPX F/U 2/21/2025 [place].    PT STATES SHE NEEDS LABWORK FOR UPCOMING CTA, BUT THE ORDER THAT SHE BROUGHT TO THE LAB WAS CANCELED/OUTDATED.    ATTEMPTED TO TRANSFER ELZA TO NURSE BUT NO NURSE WAS AVAILABLE.    PLEASE NOTIFY PT WHEN LABS ARE UPDATED FOR CTA      THANK YOU

## 2025-01-30 DIAGNOSIS — J06.9 VIRAL URI: ICD-10-CM

## 2025-01-30 RX ORDER — LIDOCAINE 40 MG/G
CREAM TOPICAL AS NEEDED
Qty: 30 G | Refills: 3 | Status: SHIPPED | OUTPATIENT
Start: 2025-01-30

## 2025-01-31 RX ORDER — ALBUTEROL SULFATE 90 UG/1
2 INHALANT RESPIRATORY (INHALATION) EVERY 6 HOURS PRN
Qty: 8.5 G | Refills: 1 | Status: SHIPPED | OUTPATIENT
Start: 2025-01-31

## 2025-02-05 ENCOUNTER — APPOINTMENT (OUTPATIENT)
Dept: LAB | Facility: CLINIC | Age: 65
End: 2025-02-05
Payer: MEDICARE

## 2025-02-05 DIAGNOSIS — Z01.812 PRE-PROCEDURE LAB EXAM: ICD-10-CM

## 2025-02-05 LAB
BUN SERPL-MCNC: 14 MG/DL (ref 5–25)
CREAT SERPL-MCNC: 0.6 MG/DL (ref 0.6–1.3)
GFR SERPL CREATININE-BSD FRML MDRD: 95 ML/MIN/1.73SQ M

## 2025-02-05 PROCEDURE — 82565 ASSAY OF CREATININE: CPT

## 2025-02-05 PROCEDURE — 36415 COLL VENOUS BLD VENIPUNCTURE: CPT

## 2025-02-05 PROCEDURE — 84520 ASSAY OF UREA NITROGEN: CPT

## 2025-02-12 DIAGNOSIS — I10 HYPERTENSION, UNSPECIFIED TYPE: ICD-10-CM

## 2025-02-13 RX ORDER — LISINOPRIL 20 MG/1
20 TABLET ORAL DAILY
Qty: 90 TABLET | Refills: 3 | Status: SHIPPED | OUTPATIENT
Start: 2025-02-13

## 2025-02-14 ENCOUNTER — HOSPITAL ENCOUNTER (OUTPATIENT)
Dept: RADIOLOGY | Facility: HOSPITAL | Age: 65
Discharge: HOME | End: 2025-02-14
Payer: MEDICARE

## 2025-02-14 DIAGNOSIS — I72.5 ANEURYSM OF BASILAR ARTERY (HCC): ICD-10-CM

## 2025-02-14 PROCEDURE — G1004 CDSM NDSC: HCPCS

## 2025-02-14 PROCEDURE — 70496 CT ANGIOGRAPHY HEAD: CPT

## 2025-02-14 RX ADMIN — IOHEXOL 75 ML: 350 INJECTION, SOLUTION INTRAVENOUS at 13:01

## 2025-02-21 ENCOUNTER — OFFICE VISIT (OUTPATIENT)
Dept: NEUROSURGERY | Facility: CLINIC | Age: 65
End: 2025-02-21
Payer: MEDICARE

## 2025-02-21 VITALS
HEIGHT: 64 IN | WEIGHT: 128 LBS | TEMPERATURE: 97.9 F | OXYGEN SATURATION: 99 % | HEART RATE: 59 BPM | SYSTOLIC BLOOD PRESSURE: 130 MMHG | DIASTOLIC BLOOD PRESSURE: 80 MMHG | BODY MASS INDEX: 21.85 KG/M2 | RESPIRATION RATE: 17 BRPM

## 2025-02-21 DIAGNOSIS — H53.8 BLURRY VISION, BILATERAL: ICD-10-CM

## 2025-02-21 DIAGNOSIS — I72.5 ANEURYSM OF BASILAR ARTERY (HCC): Primary | ICD-10-CM

## 2025-02-21 DIAGNOSIS — E11.9 TYPE 2 DIABETES MELLITUS WITHOUT COMPLICATION (HCC): ICD-10-CM

## 2025-02-21 PROCEDURE — 99214 OFFICE O/P EST MOD 30 MIN: CPT | Performed by: NURSE PRACTITIONER

## 2025-02-21 NOTE — PATIENT INSTRUCTIONS
Suggestions for vitamins to help with sleep    Melatonin  8- 10 mg  one table 30 minutes before going to bed  Tryptophan 1000- 2000 mg 30-45 minutes before going to bed    Benadryl  25 mg one pill before sleep

## 2025-02-21 NOTE — PROGRESS NOTES
Name: Ingrid Sanches      : 1960      MRN: [id number]  Encounter Provider: Ralph Brand MD  Encounter Date: 2025   Encounter department: [facility]  :  Assessment & Plan  Blurry vision, bilateral  Blurry vision complaint ---reports blurry vision worsening since last appointment   Reports optometrist will no longer see her since several years ago , does not examine  patients with diabetes.     Orders:    Ambulatory Referral to Ophthalmology; Future    Type 2 diabetes mellitus without complication (HCC)    Lab Results   Component Value Date    HGBA1C 6.4 2024     Orders:    Ambulatory Referral to Ophthalmology; Future  Diabetic eye examination , worsening blurry vision per patient.  Aneurysm of basilar artery (HCC)  As addressed in HPI    Identified risk factors for aneurysm growth and rupture   HTN B/P 130/80 , RX Norvasc , lisinopril  HLD --HO , treatment with Crestor  Tobacco dependence  ---  She also reports having difficulty quitting smoking , adverse effect to nicotine products, she has failed multiple attempts to stop smoking. Reports a history of anxiety that is relieved with smoking..   Admits she has PCP over site of co- morbid risk conditions ever 3-6 months or as otherwise specified ,   Reports she continues with pounding noise in her ears whenever she is lying down but resolves when setting. Reports she has a lot on her mind and worries a lot.   Continues to deny 2 or more first degree relatives diagnosed with known brain aneurysms or have  suddenly of an unknown cause --denies   Reports intermittent use of ASA whenever she has a headache.     Imagining   2025 CTA Head w/wo --- . 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     Plan   Discussed imagining result  with patient/daughter    Explained the aneurysm is likely unrelated  to her current  "complaints.  Extensively discussed natural nature of aneurysms.    Discussed risk of rupture is less than 1%/year per UCAS and <1%/5yrs per ISIUA.    Discussed modifiable risk factors including hypertension, hyperlipidemia, and smoking. Reinforced low salt and low fat dies and smoking cessation.    Discussed family history, as above.   Discussed signs and symptoms of aneurysm rupture including severe, sudden onset headache \"WHOL\", neck pain, nausea and vomiting, and seizure.  Reiterated that the symptoms should prompt patient to visit in emergency department immediately call 911.  Ordered CTA H/N w/wo due 2/14/2026 .  Request checkout schedule f/u appointment 3 days to one weeks post as joint with Dr. Brand and I .   Of note as per discussion with Dr. Brand if imaging in 2025 without significant change we will increase interval surveillance imaging to every 2 years,. Patient request for recheck next year. Will re-discuss with patient next visit to increase surveillance arnulfo every 2 years.  Advised if he/she has additional questions or concerns to please contact the neurosurgery office  [facility] teaching .      Orders:    CTA head and neck w wo contrast; Future    Of note reports she worries a lot and has problems falling asleep. Report she currently worries about her living situation.  She requested an order for sleep med. Deferred to her PCP , reports PCP advised her to ask neurosurgery given her aneurysm.  Discussed sleep aid options and documented in [facility]. Advised she f/u with PCP as she provides overall management of her care.   Documented sleep aid options in [facility], she can show the pharmist he will assist her in locating. She should also discuss with PCP.  Sleep aid suggestions :    Melatonin  8- 10 mg  one table 30 minutes before going to bed  Tryptophan 1000- 2000 mg 30-45 minutes before going to bed    Benadryl  25 mg one pill before sleep     History of Present Illness     Ingridnathaniel Martinezabdon is a 64 y.o. " female who presents for one year f/u visit aneurysm surveillance.    HPI   Incidental finding of intracranial aneurysm during workup by PCP for pulsatile tinnitus in November 2021.  Was referred to ENT. An MRA  head was ordered by Dr. Alonso which demonstrated a basilar tip aneurysm.  MRA of the head completed which demonstrated aneurysm.     CTA head w/wo, 2/4/2022: CT angiography confirms tiny tip of the basilar aneurysm.  Recommend follow-up CT or MR angiography in 6-12 months.  MRA head wo, 12/30/2021: Focal outpouching arising from the superior aspect of the basilar tip suggestive of a 1.5 mm aneurysm.  CT angiography recommended as well as consultation with [facility], [facility] of [facility] at [phone].     Initial neurosurgery visit  2/7/2022, DR. Brand as per provider documentation  Unclear if this is a true aneurysm and suspect is likely a  infundibulum. However this cannot be distinguished on noninvasive imaging. Nevertheless, if a true aneurysm the risk of rupture is quite low, and well below 1% per year.         Social   Worked as a house keeper. Retires    Grandson lived with her .  She has a daughter that lives close by.      Review of Systems   Constitutional: Negative.    HENT:  Positive for tinnitus (poundoing sound both ears occ).    Eyes:  Positive for visual disturbance (blurry worsening both eyes).   Respiratory: Negative.     Cardiovascular: Negative.    Gastrointestinal:  Positive for constipation.   Endocrine: Negative.    Genitourinary: Negative.    Musculoskeletal:  Positive for gait problem (ujnsteady at toimes due to pain in her left hip).   Skin: Negative.    Allergic/Immunologic: Negative.    Neurological:  Positive for headaches (occ slight headaches tylenol helps). Negative for dizziness, light-headedness and numbness.   Hematological: Negative.    Psychiatric/Behavioral:  Positive for confusion (stm loss).      I have  personally reviewed the MA's review of systems and made changes as necessary.    Past Medical History   Past Medical History:   Diagnosis Date    Diabetes mellitus (HCC)     Hyperlipidemia     Hypertension      Past Surgical History:   Procedure Laterality Date    BREAST BIOPSY Left 2021    stereo    BREAST EXCISIONAL BIOPSY Left     8 yrs ago benign in [place]    BREAST SURGERY  2016    removal of cyst      SECTION      COLONOSCOPY  2021    MAMMO STEREOTACTIC BREAST BIOPSY LEFT (ALL INC) Left 2021    TUBAL LIGATION      US GUIDED BREAST BIOPSY RIGHT COMPLETE Right 2021     Family History   Problem Relation Age of Onset    Diabetes Mother     Diabetes Sister     Diabetes Sister     Diabetes Sister     No Known Problems Sister     Endometrial cancer Sister 58    No Known Problems Daughter     No Known Problems Maternal Grandmother     No Known Problems Maternal Grandfather     Diabetes Paternal Grandmother     Cancer Paternal Grandmother     Endometrial cancer Maternal Aunt         unsure of age    Cancer Maternal Aunt     No Known Problems Maternal Aunt     No Known Problems Maternal Aunt     No Known Problems Maternal Aunt     No Known Problems Maternal Aunt     No Known Problems Paternal Aunt     No Known Problems Paternal Aunt     No Known Problems Paternal Aunt     No Known Problems Paternal Aunt     No Known Problems Paternal Aunt     No Known Problems Paternal Aunt     No Known Problems Paternal Aunt     No Known Problems Son     No Known Problems Son      she reports that she has been smoking cigarettes. She has never used smokeless tobacco. She reports that she does not currently use alcohol. She reports that she does not use drugs.  Current Outpatient Medications   Medication Instructions    acetaminophen (TYLENOL) 500 mg, Every 6 hours PRN    albuterol (PROVENTIL HFA,VENTOLIN HFA) 90 mcg/act inhaler 2 puffs, Inhalation, Every 6 hours PRN    amLODIPine (NORVASC) 5 mg  "tablet TAKE ONE TABLET BY MOUTH DAILY    cholecalciferol (VITAMIN D3) 2,000 Units, Oral, Daily, As directed    Diclofenac Sodium (VOLTAREN) 4 g, Topical, 4 times daily    docusate sodium (COLACE) 250 mg, Oral, 2 times daily    guaiFENesin (ROBITUSSIN) 200 mg, Oral, 3 times daily PRN    lidocaine (LMX) 4 % cream Topical, As needed    lisinopril (ZESTRIL) 20 mg, Oral, Daily, As directed    rosuvastatin (CRESTOR) 40 mg, Oral, Daily, As directed    urea (CARMOL) 40 % Topical, Daily, Apply topically daily to affected areas on bilateral plantar foot.     Allergies   Allergen Reactions    Nicoderm [Nicotine] Palpitations, Other (See Comments) and Chest Pain     Nicotine gum - nose bleeds      Objective   /80 (BP Location: Left arm, Patient Position: Sitting, Cuff Size: Standard)   Pulse 59   Temp 97.9 °F (36.6 °C) (Temporal)   Resp 17   Ht 5' 4\" (1.626 m)   Wt 58.1 kg (128 lb)   SpO2 99%   BMI 21.97 kg/m²   Vitals:    02/21/25 1019   BP: 130/80   Pulse: 59   Resp: 17   Temp: 97.9 °F (36.6 °C)   SpO2: 99%         Physical Exam  Vitals and nursing note reviewed.   Constitutional:       General: She is not in acute distress.     Appearance: She is not ill-appearing, toxic-appearing or diaphoretic.   Eyes:      General: No scleral icterus.        Right eye: No discharge.         Left eye: No discharge.      Extraocular Movements: Extraocular movements intact.      Conjunctiva/sclera: Conjunctivae normal.      Pupils: Pupils are equal, round, and reactive to light.   Pulmonary:      Effort: Pulmonary effort is normal. No respiratory distress.   Musculoskeletal:         General: Normal range of motion.      Cervical back: Normal range of motion.      Right lower leg: No edema.      Left lower leg: No edema.   Skin:     General: Skin is warm and dry.   Neurological:      General: No focal deficit present.      Mental Status: She is alert.      Motor: Motor strength is normal.     Coordination: Coordination is intact. "      Deep Tendon Reflexes: Reflexes are normal and symmetric.   Psychiatric:         Mood and Affect: Mood normal.         Behavior: Behavior normal.       Neurological Exam  Mental Status  Alert. Oriented to person, place, time and situation.    Cranial Nerves  CN III, IV, VI: Extraocular movements intact bilaterally. Pupils equal round and reactive to light bilaterally.    Motor  Decreased muscle bulk throughout. Normal muscle tone. Strength is 5/5 throughout all four extremities.    Sensory  Light touch is normal in upper and lower extremities.     Reflexes  Deep tendon reflexes are 2+ and symmetric in all four extremities.    Coordination    Finger-to-nose, rapid alternating movements and heel-to-shin normal bilaterally without dysmetria.    Gait  Normal casual, toe, heel and tandem gait.      Radiology Results Review: I have reviewed radiology reports from [facility]/PACS including: CT A H/N.                Administrative Statements   I have spent a total time of 35 minutes in caring for this patient on the day of the visit/encounter including Diagnostic results, Risks and benefits of tx options, Instructions for management, Patient and family education, Importance of tx compliance, Risk factor reductions, Impressions, Counseling / Coordination of care, Documenting in the medical record, Reviewing/placing orders in the medical record (including tests, medications, and/or procedures), Obtaining or reviewing history  , and Communicating with other healthcare professionals .

## 2025-02-21 NOTE — ASSESSMENT & PLAN NOTE
"As addressed in HPI    Identified risk factors for aneurysm growth and rupture   HTN B/P 130/80 , RX Norvasc , lisinopril  HLD --HO , treatment with Crestor  Tobacco dependence  ---  She also reports having difficulty quitting smoking , adverse effect to nicotine products, she has failed multiple attempts to stop smoking. Reports a history of anxiety that is relieved with smoking..   Admits she has PCP over site of co- morbid risk conditions ever 3-6 months or as otherwise specified ,   Reports she continues with pounding noise in her ears whenever she is lying down but resolves when setting. Reports she has a lot on her mind and worries a lot.   Continues to deny 2 or more first degree relatives diagnosed with known brain aneurysms or have  suddenly of an unknown cause --denies   Reports intermittent use of ASA whenever she has a headache.     Imagining   2025 CTA Head w/wo --- . 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     Plan   Discussed imagining result  with patient/daughter    Explained the aneurysm is likely unrelated  to her current complaints.  Extensively discussed natural nature of aneurysms.    Discussed risk of rupture is less than 1%/year per UCAS and <1%/5yrs per ISIUA.    Discussed modifiable risk factors including hypertension, hyperlipidemia, and smoking. Reinforced low salt and low fat dies and smoking cessation.    Discussed family history, as above.   Discussed signs and symptoms of aneurysm rupture including severe, sudden onset headache \"WHOL\", neck pain, nausea and vomiting, and seizure.  Reiterated that the symptoms should prompt patient to visit in emergency department immediately call 911.  Ordered CTA H/N w/wo due 2026 .  Request checkout schedule f/u appointment 3 days to one weeks post as joint with Dr. Brand and I .   Of note as per discussion with Dr. Brand if imaging in  " without significant change we will increase interval surveillance imaging to every 2 years,. Patient request for recheck next year. Will re-discuss with patient next visit to increase surveillance arnulfo every 2 years.  Advised if he/she has additional questions or concerns to please contact the neurosurgery office  [employer] .      Orders:    CTA head and neck w wo contrast; Future

## 2025-02-27 DIAGNOSIS — G89.29 CHRONIC LEFT SHOULDER PAIN: ICD-10-CM

## 2025-02-27 DIAGNOSIS — M25.512 CHRONIC LEFT SHOULDER PAIN: ICD-10-CM

## 2025-03-21 ENCOUNTER — RA CDI HCC (OUTPATIENT)
Dept: OTHER | Facility: HOSPITAL | Age: 65
End: 2025-03-21

## 2025-03-24 DIAGNOSIS — J06.9 VIRAL URI: ICD-10-CM

## 2025-03-25 RX ORDER — ALBUTEROL SULFATE 90 UG/1
2 INHALANT RESPIRATORY (INHALATION) EVERY 6 HOURS PRN
Qty: 8.5 G | Refills: 1 | Status: SHIPPED | OUTPATIENT
Start: 2025-03-25

## 2025-04-01 DIAGNOSIS — G89.29 CHRONIC LEFT SHOULDER PAIN: ICD-10-CM

## 2025-04-01 DIAGNOSIS — M25.512 CHRONIC LEFT SHOULDER PAIN: ICD-10-CM

## 2025-04-08 ENCOUNTER — APPOINTMENT (OUTPATIENT)
Dept: LAB | Facility: CLINIC | Age: 65
End: 2025-04-08
Payer: MEDICARE

## 2025-04-08 ENCOUNTER — OFFICE VISIT (OUTPATIENT)
Dept: INTERNAL MEDICINE CLINIC | Facility: CLINIC | Age: 65
End: 2025-04-08

## 2025-04-08 VITALS
HEART RATE: 80 BPM | TEMPERATURE: 97.8 F | WEIGHT: 125 LBS | DIASTOLIC BLOOD PRESSURE: 70 MMHG | BODY MASS INDEX: 21.34 KG/M2 | SYSTOLIC BLOOD PRESSURE: 152 MMHG | HEIGHT: 64 IN

## 2025-04-08 DIAGNOSIS — I10 PRIMARY HYPERTENSION: ICD-10-CM

## 2025-04-08 DIAGNOSIS — Z00.00 MEDICARE ANNUAL WELLNESS VISIT, SUBSEQUENT: Primary | ICD-10-CM

## 2025-04-08 DIAGNOSIS — I67.1 CEREBRAL ANEURYSM, NONRUPTURED: ICD-10-CM

## 2025-04-08 DIAGNOSIS — Z79.4 TYPE 2 DIABETES MELLITUS WITHOUT COMPLICATION, WITH LONG-TERM CURRENT USE OF INSULIN (HCC): ICD-10-CM

## 2025-04-08 DIAGNOSIS — Z12.31 BREAST CANCER SCREENING BY MAMMOGRAM: ICD-10-CM

## 2025-04-08 DIAGNOSIS — E55.9 VITAMIN D DEFICIENCY: ICD-10-CM

## 2025-04-08 DIAGNOSIS — F17.210 CIGARETTE NICOTINE DEPENDENCE WITHOUT COMPLICATION: ICD-10-CM

## 2025-04-08 DIAGNOSIS — F41.1 GENERALIZED ANXIETY DISORDER: ICD-10-CM

## 2025-04-08 DIAGNOSIS — M25.552 LEFT HIP PAIN: ICD-10-CM

## 2025-04-08 DIAGNOSIS — E11.9 TYPE 2 DIABETES MELLITUS WITHOUT COMPLICATION, WITH LONG-TERM CURRENT USE OF INSULIN (HCC): ICD-10-CM

## 2025-04-08 DIAGNOSIS — E11.9 TYPE 2 DIABETES MELLITUS WITHOUT COMPLICATION, WITHOUT LONG-TERM CURRENT USE OF INSULIN (HCC): ICD-10-CM

## 2025-04-08 DIAGNOSIS — Z72.0 TOBACCO ABUSE DISORDER: ICD-10-CM

## 2025-04-08 LAB
25(OH)D3 SERPL-MCNC: 41.1 NG/ML (ref 30–100)
ALBUMIN SERPL BCG-MCNC: 4.8 G/DL (ref 3.5–5)
ALP SERPL-CCNC: 78 U/L (ref 34–104)
ALT SERPL W P-5'-P-CCNC: 15 U/L (ref 7–52)
ANION GAP SERPL CALCULATED.3IONS-SCNC: 10 MMOL/L (ref 4–13)
ANISOCYTOSIS BLD QL SMEAR: PRESENT
AST SERPL W P-5'-P-CCNC: 17 U/L (ref 13–39)
BASOPHILS # BLD MANUAL: 0 THOUSAND/UL (ref 0–0.1)
BASOPHILS NFR MAR MANUAL: 0 % (ref 0–1)
BILIRUB SERPL-MCNC: 0.31 MG/DL (ref 0.2–1)
BUN SERPL-MCNC: 13 MG/DL (ref 5–25)
CALCIUM SERPL-MCNC: 10 MG/DL (ref 8.4–10.2)
CHLORIDE SERPL-SCNC: 104 MMOL/L (ref 96–108)
CO2 SERPL-SCNC: 29 MMOL/L (ref 21–32)
CREAT SERPL-MCNC: 0.58 MG/DL (ref 0.6–1.3)
CREAT UR-MCNC: 39.4 MG/DL
EOSINOPHIL # BLD MANUAL: 0.12 THOUSAND/UL (ref 0–0.4)
EOSINOPHIL NFR BLD MANUAL: 1 % (ref 0–6)
ERYTHROCYTE [DISTWIDTH] IN BLOOD BY AUTOMATED COUNT: 16.1 % (ref 11.6–15.1)
GFR SERPL CREATININE-BSD FRML MDRD: 95 ML/MIN/1.73SQ M
GLUCOSE P FAST SERPL-MCNC: 107 MG/DL (ref 65–99)
HCT VFR BLD AUTO: 45.3 % (ref 34.8–46.1)
HGB BLD-MCNC: 14 G/DL (ref 11.5–15.4)
LYMPHOCYTES # BLD AUTO: 48 % (ref 14–44)
LYMPHOCYTES # BLD AUTO: 6.92 THOUSAND/UL (ref 0.6–4.47)
MCH RBC QN AUTO: 26.3 PG (ref 26.8–34.3)
MCHC RBC AUTO-ENTMCNC: 30.9 G/DL (ref 31.4–37.4)
MCV RBC AUTO: 85 FL (ref 82–98)
MICROALBUMIN UR-MCNC: <7 MG/L
MONOCYTES # BLD AUTO: 0.12 THOUSAND/UL (ref 0–1.22)
MONOCYTES NFR BLD: 1 % (ref 4–12)
NEUTROPHILS # BLD MANUAL: 5.19 THOUSAND/UL (ref 1.85–7.62)
NEUTS SEG NFR BLD AUTO: 42 % (ref 43–75)
OVALOCYTES BLD QL SMEAR: PRESENT
PLATELET # BLD AUTO: 271 THOUSANDS/UL (ref 149–390)
PLATELET BLD QL SMEAR: ADEQUATE
PMV BLD AUTO: 10.9 FL (ref 8.9–12.7)
POLYCHROMASIA BLD QL SMEAR: PRESENT
POTASSIUM SERPL-SCNC: 4.2 MMOL/L (ref 3.5–5.3)
PROT SERPL-MCNC: 7.8 G/DL (ref 6.4–8.4)
RBC # BLD AUTO: 5.32 MILLION/UL (ref 3.81–5.12)
RBC MORPH BLD: PRESENT
SL AMB POCT HEMOGLOBIN AIC: 6.3 (ref ?–6.5)
SMUDGE CELLS BLD QL SMEAR: PRESENT
SODIUM SERPL-SCNC: 143 MMOL/L (ref 135–147)
TARGETS BLD QL SMEAR: PRESENT
VARIANT LYMPHS # BLD AUTO: 8 %
WBC # BLD AUTO: 12.36 THOUSAND/UL (ref 4.31–10.16)

## 2025-04-08 PROCEDURE — 85027 COMPLETE CBC AUTOMATED: CPT

## 2025-04-08 PROCEDURE — 80053 COMPREHEN METABOLIC PANEL: CPT

## 2025-04-08 PROCEDURE — 82043 UR ALBUMIN QUANTITATIVE: CPT

## 2025-04-08 PROCEDURE — 36415 COLL VENOUS BLD VENIPUNCTURE: CPT

## 2025-04-08 PROCEDURE — 82570 ASSAY OF URINE CREATININE: CPT

## 2025-04-08 PROCEDURE — 85007 BL SMEAR W/DIFF WBC COUNT: CPT

## 2025-04-08 PROCEDURE — 82306 VITAMIN D 25 HYDROXY: CPT

## 2025-04-08 RX ORDER — BUSPIRONE HYDROCHLORIDE 5 MG/1
5 TABLET ORAL 3 TIMES DAILY PRN
Qty: 60 TABLET | Refills: 1 | Status: SHIPPED | OUTPATIENT
Start: 2025-04-08

## 2025-04-08 RX ORDER — VARENICLINE TARTRATE 0.5 (11)-1
KIT ORAL
Qty: 53 EACH | Refills: 0 | Status: SHIPPED | OUTPATIENT
Start: 2025-04-08

## 2025-04-08 NOTE — PATIENT INSTRUCTIONS
Medicare Preventive Visit Patient Instructions  Thank you for completing your Welcome to Medicare Visit or Medicare Annual Wellness Visit today. Your next wellness visit will be due in one year (4/9/2026).  The screening/preventive services that you may require over the next 5-10 years are detailed below. Some tests may not apply to you based off risk factors and/or age. Screening tests ordered at today's visit but not completed yet may show as past due. Also, please note that scanned in results may not display below.  Preventive Screenings:  Service Recommendations Previous Testing/Comments   Colorectal Cancer Screening  * Colonoscopy    * Fecal Occult Blood Test (FOBT)/Fecal Immunochemical Test (FIT)  * Fecal DNA/Cologuard Test  * Flexible Sigmoidoscopy Age: 45-75 years old   Colonoscopy: every 10 years (may be performed more frequently if at higher risk)  OR  FOBT/FIT: every 1 year  OR  Cologuard: every 3 years  OR  Sigmoidoscopy: every 5 years  Screening may be recommended earlier than age 45 if at higher risk for colorectal cancer. Also, an individualized decision between you and your healthcare provider will decide whether screening between the ages of 76-85 would be appropriate. Colonoscopy: 01/20/2021  FOBT/FIT: Not on file  Cologuard: Not on file  Sigmoidoscopy: Not on file          Breast Cancer Screening Age: 40+ years old  Frequency: every 1-2 years  Not required if history of left and right mastectomy Mammogram: 02/06/2024        Cervical Cancer Screening Between the ages of 21-29, pap smear recommended once every 3 years.   Between the ages of 30-65, can perform pap smear with HPV co-testing every 5 years.   Recommendations may differ for women with a history of total hysterectomy, cervical cancer, or abnormal pap smears in past. Pap Smear: 11/13/2023        Hepatitis C Screening Once for adults born between 1945 and 1965  More frequently in patients at high risk for Hepatitis C Hep C Antibody:  12/08/2020        Diabetes Screening 1-2 times per year if you're at risk for diabetes or have pre-diabetes Fasting glucose: 111 mg/dL (1/11/2024)  A1C: 6.4 (7/11/2024)      Cholesterol Screening Once every 5 years if you don't have a lipid disorder. May order more often based on risk factors. Lipid panel: 03/21/2022          Other Preventive Screenings Covered by Medicare:  Abdominal Aortic Aneurysm (AAA) Screening: covered once if your at risk. You're considered to be at risk if you have a family history of AAA.  Lung Cancer Screening: covers low dose CT scan once per year if you meet all of the following conditions: (1) Age 55-77; (2) No signs or symptoms of lung cancer; (3) Current smoker or have quit smoking within the last 15 years; (4) You have a tobacco smoking history of at least 20 pack years (packs per day multiplied by number of years you smoked); (5) You get a written order from a healthcare provider.  Glaucoma Screening: covered annually if you're considered high risk: (1) You have diabetes OR (2) Family history of glaucoma OR (3)  aged 50 and older OR (4)  American aged 65 and older  Osteoporosis Screening: covered every 2 years if you meet one of the following conditions: (1) You're estrogen deficient and at risk for osteoporosis based off medical history and other findings; (2) Have a vertebral abnormality; (3) On glucocorticoid therapy for more than 3 months; (4) Have primary hyperparathyroidism; (5) On osteoporosis medications and need to assess response to drug therapy.   Last bone density test (DXA Scan): 05/13/2024.  HIV Screening: covered annually if you're between the age of 15-65. Also covered annually if you are younger than 15 and older than 65 with risk factors for HIV infection. For pregnant patients, it is covered up to 3 times per pregnancy.    Immunizations:  Immunization Recommendations   Influenza Vaccine Annual influenza vaccination during flu season is  recommended for all persons aged >= 6 months who do not have contraindications   Pneumococcal Vaccine   * Pneumococcal conjugate vaccine = PCV13 (Prevnar 13), PCV15 (Vaxneuvance), PCV20 (Prevnar 20)  * Pneumococcal polysaccharide vaccine = PPSV23 (Pneumovax) Adults 19-63 yo with certain risk factors or if 65+ yo  If never received any pneumonia vaccine: recommend Prevnar 20 (PCV20)  Give PCV20 if previously received 1 dose of PCV13 or PPSV23   Hepatitis B Vaccine 3 dose series if at intermediate or high risk (ex: diabetes, end stage renal disease, liver disease)   Respiratory syncytial virus (RSV) Vaccine - COVERED BY MEDICARE PART D  * RSVPreF3 (Arexvy) CDC recommends that adults 60 years of age and older may receive a single dose of RSV vaccine using shared clinical decision-making (SCDM)   Tetanus (Td) Vaccine - COST NOT COVERED BY MEDICARE PART B Following completion of primary series, a booster dose should be given every 10 years to maintain immunity against tetanus. Td may also be given as tetanus wound prophylaxis.   Tdap Vaccine - COST NOT COVERED BY MEDICARE PART B Recommended at least once for all adults. For pregnant patients, recommended with each pregnancy.   Shingles Vaccine (Shingrix) - COST NOT COVERED BY MEDICARE PART B  2 shot series recommended in those 19 years and older who have or will have weakened immune systems or those 50 years and older     Health Maintenance Due:      Topic Date Due   • Breast Cancer Screening: Mammogram  02/06/2025   • Colorectal Cancer Screening  01/20/2026   • Hepatitis C Screening  Completed     Immunizations Due:      Topic Date Due   • Influenza Vaccine (1) 09/01/2024   • COVID-19 Vaccine (4 - 2024-25 season) 09/01/2024     Advance Directives   What are advance directives?  Advance directives are legal documents that state your wishes and plans for medical care. These plans are made ahead of time in case you lose your ability to make decisions for yourself. Advance  directives can apply to any medical decision, such as the treatments you want, and if you want to donate organs.   What are the types of advance directives?  There are many types of advance directives, and each state has rules about how to use them. You may choose a combination of any of the following:  Living will:  This is a written record of the treatment you want. You can also choose which treatments you do not want, which to limit, and which to stop at a certain time. This includes surgery, medicine, IV fluid, and tube feedings.   Durable power of  for healthcare (DPAHC):  This is a written record that states who you want to make healthcare choices for you when you are unable to make them for yourself. This person, called a proxy, is usually a family member or a friend. You may choose more than 1 proxy.  Do not resuscitate (DNR) order:  A DNR order is used in case your heart stops beating or you stop breathing. It is a request not to have certain forms of treatment, such as CPR. A DNR order may be included in other types of advance directives.  Medical directive:  This covers the care that you want if you are in a coma, near death, or unable to make decisions for yourself. You can list the treatments you want for each condition. Treatment may include pain medicine, surgery, blood transfusions, dialysis, IV or tube feedings, and a ventilator (breathing machine).  Values history:  This document has questions about your views, beliefs, and how you feel and think about life. This information can help others choose the care that you would choose.  Why are advance directives important?  An advance directive helps you control your care. Although spoken wishes may be used, it is better to have your wishes written down. Spoken wishes can be misunderstood, or not followed. Treatments may be given even if you do not want them. An advance directive may make it easier for your family to make difficult choices about  your care.   Cigarette Smoking and Your Health   Risks to your health if you smoke:  Nicotine and other chemicals found in tobacco damage every cell in your body. Even if you are a light smoker, you have an increased risk for cancer, heart disease, and lung disease. If you are pregnant or have diabetes, smoking increases your risk for complications.   Benefits to your health if you stop smoking:   You decrease respiratory symptoms such as coughing, wheezing, and shortness of breath.   You reduce your risk for cancers of the lung, mouth, throat, kidney, bladder, pancreas, stomach, and cervix. If you already have cancer, you increase the benefits of chemotherapy. You also reduce your risk for cancer returning or a second cancer from developing.   You reduce your risk for heart disease, blood clots, heart attack, and stroke.   You reduce your risk for lung infections, and diseases such as pneumonia, asthma, chronic bronchitis, and emphysema.  Your circulation improves. More oxygen can be delivered to your body. If you have diabetes, you lower your risk for complications, such as kidney, artery, and eye diseases. You also lower your risk for nerve damage. Nerve damage can lead to amputations, poor vision, and blindness.  You improve your body's ability to heal and to fight infections.  For more information and support to stop smoking:   Smokefree.gov  Phone: 9- 304 - 423-5311  Web Address: www.[employer].gov     © Copyright [employer] 2018 Information is for End User's use only and may not be sold, redistributed or otherwise used for commercial purposes. All illustrations and images included in CareNotes® are the copyrighted property of [employer] Inc[employer] or [employer]

## 2025-04-08 NOTE — ASSESSMENT & PLAN NOTE
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  11/24/2021 complaining of pulsatile tinnitus, MRA ordered and demonstrated  CTA yearly, scheduled 2025, repeat 2026  Counseled on smoking cessation, see below

## 2025-04-08 NOTE — ASSESSMENT & PLAN NOTE
Patient states that she worries incessantly about many things and often finds herself unable to fall asleep  Typically goes to smoke or drink coffee when stressed  Previously failed Wellbutrin in setting of Co-contaminant tobacco use disorder  Patient declines seeing therapist at this time  Buspar TID PRN, with likely titration upwards and or scheduled vs SSRI trial      Orders:    busPIRone (BUSPAR) 5 mg tablet; Take 1 tablet (5 mg total) by mouth 3 (three) times a day as needed (anxiety)

## 2025-04-08 NOTE — PROGRESS NOTES
Name: Ingrid Sanches      : 1960      MRN: [id number]  Encounter Provider: Raquel Couch DO  Encounter Date: 2025   Encounter department: [facility] [place]  :  Assessment & Plan  Medicare annual wellness visit, subsequent         Type 2 diabetes mellitus without complication, with long-term current use of insulin (HCC)    Lab Results   Component Value Date    HGBA1C 6.3 2025     Currently at goal for A1c (<7%, <8% if >80)  Continue current regimen of diet control  Continue Ace-i/arb  IS on statin, continue/consider adding            repeat lipids q3yrs, due   Not following optho, next eye exam due asap, completed in office today, due   not following podiatry, next foot exam due asap, completed in office today, due   yearly micro albumin creatinine ratio is not UTD, due asap  Carb controlled diet, discussed healthy lifestyle modifications  consider DM nutrition referral    Orders:    POCT hemoglobin A1c    IRIS Diabetic eye exam    Albumin / creatinine urine ratio; Future    Comprehensive metabolic panel; Future    CBC and differential; Future    Cigarette nicotine dependence without complication  Ready to quit: Not Answered  Counseling given: Not Answered  Tobacco comments: Smokes about 4 cigarettes a day     Tobacco use is a significant patient-modifiable risk factor for this patient’s vascular disease with multiple vascular comorbidities, and a significant risk factor for failure of and complications from any endovascular or surgical interventions.    I explained to the patient the effects of smoking including peripheral artery disease, coronary artery disease, cerebrovascular disease as well as cancer and chronic obstructive pulmonary disease. I asked the patient to stop smoking immediately. It is never too late to quit, and many studies show significant health benefits as well as economical savings after smoking cessation. I offered to the  patient nicotine replacement therapy as well as referral to the smoking cessation program and access to the quit line 8-175-QZNPRGR or ambulatory referral to our network smoking cessation program.    Based on our conversation, this patient appears motivated to quit  And plans to use prescription medication to help quit    The patient set a quit date of 04/09/25 .   follow up 4 weeks to check on compliance   Discussed various ways to quit including, NRT patches, lozenges, gum; chantix, wellbutrin   Discussed side effects, risks, and benefits, which patient able to teach back. ER precautions given   Patient elected to use meds  Given number for PA QUITS     I spent approximately 14 minutes on tobacco cessation counseling with this patient.    Lung cancer screening not UTD, due asap, ordered    Orders:    CT lung screening program; Future    Varenicline Tartrate, Starter, (Chantix Starting Month Pak) 0.5 MG X 11 & 1 MG X 42 TBPK; 0.5 mg once daily for 3 days then 0.5mg twice daily for days 4-7 then 1 mg twice daily    Breast cancer screening by mammogram    Orders:    Mammo screening bilateral w 3d and cad; Future    Primary hypertension  Compounded by smoking use disorder and caffeine use disorder  continue current regimen: lisinopril 20mg daily   Pt stopped amlodipine as she was scared of taking multiple antihtn at once   Holding off on restarting amlodipine as pt would like to try lifestyle modifications first  continue smoking cessation/abstinence  Continue caffeine moderation  reccommended low salt diet (<2g per day)  counseled on continuing healthy lifestyle modifications      Cerebral aneurysm, 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  11/24/2021 complaining of pulsatile tinnitus, MRA ordered and demonstrated  CTA yearly, scheduled 2025, repeat 2026  Counseled on smoking cessation, see below         Type 2 diabetes mellitus without complication, without long-term current use of insulin (HCC)    Lab Results    Component Value Date    HGBA1C 6.3 04/08/2025            Tobacco abuse disorder         Generalized anxiety disorder  Patient states that she worries incessantly about many things and often finds herself unable to fall asleep  Typically goes to smoke or drink coffee when stressed  Previously failed Wellbutrin in setting of Co-contaminant tobacco use disorder  Patient declines seeing therapist at this time  Buspar TID PRN, with likely titration upwards and or scheduled vs SSRI trial      Orders:    busPIRone (BUSPAR) 5 mg tablet; Take 1 tablet (5 mg total) by mouth 3 (three) times a day as needed (anxiety)    Left hip pain  Continue conservative and supportive care. No red flag symptoms at this time, but reviewed ED precautions.  Encouraged pt to get lumbar imaging  Encouraged plenty of fluids as may be related to constipation as well  Consider U/S vs ct LLQ if persistent       Vitamin D deficiency  Vit D level pending    Recommend 15 minutes per day of natural unfiltered sunlight  Continue Vit D 2,000U daily supplement  Encouraged Calcium supplement 1200 mg per day  Encourage weight loss and healthy lifestyle modifications   Encouraged diet of dark leafy greens  If extremely low or daily supplementation is not sufficient, would benefit from for 50,000U qweek [id number]    Orders:    Vitamin D 25 hydroxy; Future      Depression Screening and Follow-up Plan: Patient was screened for depression during today's encounter. They screened negative with a PHQ-2 score of 0.      Tobacco Cessation Counseling: Tobacco cessation counseling was provided. The patient is sincerely urged to quit consumption of tobacco. She is ready to quit tobacco. Medication options and side effects of medication discussed. Patient agreed to medication. Varenicline (chantix) was prescribed.     Lung Cancer Screening Shared Decision Making: I discussed with her that she is a candidate for lung cancer CT screening.     The following Shared  Decision-Making points were covered:  Benefits of screening were discussed, including the rates of reduction in death from lung cancer and other causes.  Harms of screening were reviewed, including false positive tests, radiation exposure levels, risks of invasive procedures, risks of complications of screening, and risk of overdiagnosis.  I counseled on the importance of adherence to annual lung cancer LDCT screening, impact of co-morbidities, and ability or willingness to undergo diagnosis and treatment.  I counseled on the importance of maintaining abstinence as a former smoker or was counseled on the importance of smoking cessation if a current smoker    Review of Eligibility Criteria: She meets all of the criteria for Lung Cancer Screening.   - She is 65 y.o.   - She has 20 pack year tobacco history and is a current smoker or has quit within the past 15 years  - She presents no signs or symptoms of lung cancer    After discussion, the patient decided to elect lung cancer screening.    Preventive health issues were discussed with patient, and age appropriate screening tests were ordered as noted in patient's After Visit Summary. Personalized health advice and appropriate referrals for health education or preventive services given if needed, as noted in patient's After Visit Summary.    History of Present Illness     past medical history of type 2 diabetes controlled with diet, tobacco use disorder in remission, hypertension, HLD, non-ruptured cerebral anneurysm 2022 requiring yearly CTA head presents for MAWV. No acute complaints with exception of mild abdominal pain and HA    Regarding HA: wakes up almost daily, unilateral but does switch from either side. + dizziness but no falls, no imbalance, feeling of room spinning. No n/v/d; drinks 4-5 16.9ounce bottles daily, 4 cups of coffe daily, through the day, HA better after drinking coffee    Regarding mild abdominal pain, chronic, constant aching LLQ        Patient Care Team:  Raquel Couch DO as PCP - General (Internal Medicine)  Gerardo Domingo MD as PCP - PCP-[employer] ([facility])    Review of Systems   Constitutional:  Negative for chills, fatigue and fever.   HENT:  Negative for congestion, hearing loss, sinus pressure, sinus pain and tinnitus.    Eyes:  Negative for visual disturbance.   Respiratory:  Negative for cough, chest tightness, shortness of breath and wheezing.    Cardiovascular:  Negative for chest pain and palpitations.   Gastrointestinal:  Positive for abdominal pain. Negative for blood in stool, constipation, diarrhea, nausea and vomiting.   Endocrine: Negative for cold intolerance and heat intolerance.   Genitourinary:  Negative for dysuria, frequency and hematuria.   Musculoskeletal:  Negative for arthralgias and back pain.   Allergic/Immunologic: Negative for environmental allergies.   Neurological:  Negative for dizziness, syncope and headaches.   Psychiatric/Behavioral:  Negative for dysphoric mood and sleep disturbance. The patient is not nervous/anxious.      Medical History Reviewed by provider this encounter:       Annual Wellness Visit Questionnaire       Health Risk Assessment:   Patient rates overall health as fair. Patient feels that their physical health rating is same. Patient is satisfied with their life. Eyesight was rated as slightly worse. Hearing was rated as same. Patient feels that their emotional and mental health rating is same. Patients states they are never, rarely angry. Patient states they are always unusually tired/fatigued. Pain experienced in the last 7 days has been some. Patient's pain rating has been 7/10. Patient states that she has experienced no weight loss or gain in last 6 months.     Depression Screening:   PHQ-2 Score: 0      Fall Risk Screening:   In the past year, patient has experienced: no history of falling in past year      Urinary Incontinence Screening:   Patient has not leaked urine accidently in  the last six months.     Home Safety:  Patient does not have trouble with stairs inside or outside of their home. Patient has working smoke alarms and has working carbon monoxide detector. Home safety hazards include: none.     Nutrition:   Current diet is Regular.     Medications:   Patient is currently taking over-the-counter supplements. OTC medications include: see medication list. Patient is able to manage medications.     Activities of Daily Living (ADLs)/Instrumental Activities of Daily Living (IADLs):   Walk and transfer into and out of bed and chair?: Yes  Dress and groom yourself?: Yes    Bathe or shower yourself?: Yes    Feed yourself? Yes  Do your laundry/housekeeping?: Yes  Manage your money, pay your bills and track your expenses?: Yes  Make your own meals?: Yes    Do your own shopping?: Yes    Previous Hospitalizations:   Any hospitalizations or ED visits within the last 12 months?: No      Preventive Screenings      Cardiovascular Screening:    General: Screening Not Indicated and History Lipid Disorder      Diabetes Screening:     General: Screening Not Indicated and History Diabetes      Colorectal Cancer Screening:     General: Screening Current      Breast Cancer Screening:     General: Screening Current      Cervical Cancer Screening:    General: Screening Not Indicated      Lung Cancer Screening:     General: Screening Not Indicated      Hepatitis C Screening:    General: Screening Current    Immunizations:  - Immunizations due: Influenza and Zoster (Shingrix)    Screening, Brief Intervention, and Referral to Treatment (SBIRT)     Screening  Typical number of drinks in a day: 0  Typical number of drinks in a week: 0  Interpretation: Low risk drinking behavior.    Single Item Drug Screening:  How often have you used an illegal drug (including marijuana) or a prescription medication for non-medical reasons in the past year? never    Single Item Drug Screen Score: 0  Interpretation: Negative  "screen for possible drug use disorder    Social Drivers of Health     Financial Resource Strain: Low Risk  (4/8/2025)    Overall Financial Resource Strain (CARDIA)     Difficulty of Paying Living Expenses: Not hard at all   Food Insecurity: No Food Insecurity (4/8/2025)    Hunger Vital Sign     Worried About Running Out of Food in the Last Year: Never true     Ran Out of Food in the Last Year: Never true   Transportation Needs: No Transportation Needs (4/8/2025)    PRAPARE - Transportation     Lack of Transportation (Medical): No     Lack of Transportation (Non-Medical): No   Housing Stability: Low Risk  (4/8/2025)    Housing Stability Vital Sign     Unable to Pay for Housing in the Last Year: No     Number of Times Moved in the Last Year: 1     Homeless in the Last Year: No   Utilities: Not At Risk (4/8/2025)    [facility] Utilities     Threatened with loss of utilities: No     No results found.    Objective   /70 (BP Location: Left arm, Patient Position: Sitting, Cuff Size: Standard)   Pulse 80   Temp 97.8 °F (36.6 °C) (Temporal)   Ht 5' 4\" (1.626 m)   Wt 56.7 kg (125 lb)   BMI 21.46 kg/m²     Physical Exam  Vitals reviewed.   Constitutional:       General: She is not in acute distress.     Appearance: She is not ill-appearing.   HENT:      Right Ear: Tympanic membrane, ear canal and external ear normal. There is no impacted cerumen.      Left Ear: Tympanic membrane, ear canal and external ear normal. There is no impacted cerumen.      Mouth/Throat:      Mouth: Mucous membranes are moist.      Pharynx: No oropharyngeal exudate.      Comments: No lesions noted  Eyes:      General: No scleral icterus.     Conjunctiva/sclera: Conjunctivae normal.      Pupils: Pupils are equal, round, and reactive to light.   Cardiovascular:      Rate and Rhythm: Normal rate and regular rhythm.      Pulses: no weak pulses.           Dorsalis pedis pulses are 2+ on the right side and 2+ on the left side.      Heart sounds: No " murmur heard.     No gallop.   Pulmonary:      Effort: Pulmonary effort is normal.      Breath sounds: Normal breath sounds. No wheezing or rales.   Abdominal:      General: Bowel sounds are normal.      Palpations: Abdomen is soft.      Tenderness: There is no abdominal tenderness. There is no guarding or rebound.   Musculoskeletal:      Right lower leg: No edema.      Left lower leg: No edema.   Feet:      Right foot:      Skin integrity: No ulcer, skin breakdown, erythema, warmth, callus or dry skin.      Left foot:      Skin integrity: No ulcer, skin breakdown, erythema, warmth, callus or dry skin.   Skin:     General: Skin is warm and dry.      Findings: No lesion.   Neurological:      Mental Status: She is alert.   Psychiatric:         Behavior: Behavior normal.         Thought Content: Thought content normal.         Judgment: Judgment normal.       Administrative Statements   I have spent a total time of 40 minutes in caring for this patient on the day of the visit/encounter including Diagnostic results, Prognosis, Risks and benefits of tx options, Instructions for management, Patient and family education, Importance of tx compliance, Risk factor reductions, Impressions, Counseling / Coordination of care, Documenting in the medical record, Reviewing/placing orders in the medical record (including tests, medications, and/or procedures), Obtaining or reviewing history  , and Communicating with other healthcare professionals .        Diabetic Foot Exam    Patient's shoes and socks removed.    Right Foot/Ankle   Right Foot Inspection  Skin Exam: skin normal and skin intact. No dry skin, no warmth, no callus, no erythema, no maceration, no abnormal color, no pre-ulcer, no ulcer and no callus.     Toe Exam: ROM and strength within normal limits.     Sensory   Monofilament testing: intact    Vascular  Capillary refills: < 3 seconds  The right DP pulse is 2+.     Left Foot/Ankle  Left Foot Inspection  Skin Exam:  skin normal and skin intact. No dry skin, no warmth, no erythema, no maceration, normal color, no pre-ulcer, no ulcer and no callus.     Toe Exam: ROM and strength within normal limits.     Sensory   Monofilament testing: intact    Vascular  Capillary refills: < 3 seconds  The left DP pulse is 2+.     Assign Risk Category  No deformity present  No loss of protective sensation  No weak pulses  Risk: 0

## 2025-04-08 NOTE — ASSESSMENT & PLAN NOTE
Compounded by smoking use disorder and caffeine use disorder  continue current regimen: lisinopril 20mg daily   Pt stopped amlodipine as she was scared of taking multiple antihtn at once   Holding off on restarting amlodipine as pt would like to try lifestyle modifications first  continue smoking cessation/abstinence  Continue caffeine moderation  reccommended low salt diet (<2g per day)  counseled on continuing healthy lifestyle modifications

## 2025-04-08 NOTE — ASSESSMENT & PLAN NOTE
Continue conservative and supportive care. No red flag symptoms at this time, but reviewed ED precautions.  Encouraged pt to get lumbar imaging  Encouraged plenty of fluids as may be related to constipation as well  Consider U/S vs ct LLQ if persistent

## 2025-04-09 LAB
LEFT EYE DIABETIC RETINOPATHY: ABNORMAL
LEFT EYE IMAGE QUALITY: ABNORMAL
LEFT EYE MACULAR EDEMA: ABNORMAL
LEFT EYE OTHER RETINOPATHY: ABNORMAL
RIGHT EYE DIABETIC RETINOPATHY: ABNORMAL
RIGHT EYE IMAGE QUALITY: ABNORMAL
RIGHT EYE MACULAR EDEMA: ABNORMAL
RIGHT EYE OTHER RETINOPATHY: ABNORMAL
SEVERITY (EYE EXAM): ABNORMAL

## 2025-04-21 ENCOUNTER — HOSPITAL ENCOUNTER (OUTPATIENT)
Dept: RADIOLOGY | Age: 65
Discharge: HOME | End: 2025-04-21
Payer: MEDICARE

## 2025-04-21 VITALS — WEIGHT: 125 LBS | BODY MASS INDEX: 21.34 KG/M2 | HEIGHT: 64 IN

## 2025-04-21 DIAGNOSIS — Z12.31 BREAST CANCER SCREENING BY MAMMOGRAM: ICD-10-CM

## 2025-04-21 DIAGNOSIS — F17.210 CIGARETTE NICOTINE DEPENDENCE WITHOUT COMPLICATION: ICD-10-CM

## 2025-04-21 PROCEDURE — 77063 BREAST TOMOSYNTHESIS BI: CPT

## 2025-04-21 PROCEDURE — 77067 SCR MAMMO BI INCL CAD: CPT

## 2025-05-01 ENCOUNTER — RA CDI HCC (OUTPATIENT)
Dept: OTHER | Facility: HOSPITAL | Age: 65
End: 2025-05-01

## 2025-05-01 DIAGNOSIS — M25.512 CHRONIC LEFT SHOULDER PAIN: ICD-10-CM

## 2025-05-01 DIAGNOSIS — G89.29 CHRONIC LEFT SHOULDER PAIN: ICD-10-CM

## 2025-05-05 ENCOUNTER — OFFICE VISIT (OUTPATIENT)
Dept: DENTISTRY | Facility: CLINIC | Age: 65
End: 2025-05-05

## 2025-05-05 DIAGNOSIS — Z01.20 ENCOUNTER FOR DENTAL EXAMINATION: ICD-10-CM

## 2025-05-05 DIAGNOSIS — Z01.21: Primary | ICD-10-CM

## 2025-05-05 PROCEDURE — D0120 PERIODIC ORAL EVALUATION - ESTABLISHED PATIENT: HCPCS

## 2025-05-05 NOTE — PROGRESS NOTES
Periodic exam & tx planning    Ingrid Sanches 65 y.o. female presents with self to [facility] for periodic exam.  PMH reviewed, no changes, ASA II. Significant medical history: see chart. Significant allergies: NKA. Significant medications: NSF.  Pain level 0/10.    Chief complaint:  I want to replace my missing teeth    Consent:  Reviewed procedures involved with periodic exam including radiographs, oral exam, and periodontal probing.   Patient understands and consent was given by self via verbal consent.    Radiographs: Select PA(s) - #6 .    Oral cancer screening: normal.  Extraoral exam: no remarkable findings.  Intraoral exam: no remarkable findings.    Periodontal exam:  Hygiene - Fair.  Plaque - Moderate.  Horizontal bone loss -  UR: Mild (<15%).  UL: Mild (<15%).  LL: Mild (<15%).  LR: Mild (<15%).  Vertical bone loss - None.  Subgingival calculus - Localized.  BOP - Localized.  Mobility - #2 .  Furcation involvements - None.  Occlusal trauma - #2 .  Smoker - Yes.  Diabetic - Yes.  Periodontal Stage: III.  Periodontal Grade: C.  Periodontal Plan:  prophy, periodontally hopeless teeth planned to be extracted .    Caries exam:   Caries detected: #11  Teeth with elevated chance of needing RCT:  Possible retreat #6   Likely nonrestorable teeth: #2    Other remarkable findings:  #6 wnl percussion, wnl palpation, possible residual scar from hx of endo or residual infection   #2 grade III mobility, periodontally hopeless  #16 does not given clearance for mandibular partials, mesial caries, no opposing  Patient has loss of VDO, if VDO were to be opened, would need to crown 6-11 and 21-28  to occlude    Pt has old partials that she used to wear, no longer wears, but did not bring them with her today, partials are ~10 years old    Tx plan:  Presented patient various options including max CD & adriano RPD due to occlusion, pt wants to save her remaining anterior teeth     Ext #2, 16, 31 with OS  Interim partials  mx/mnd to open VDO, 6 months of patient adapting to new VDO  Anterior crowns to occlude teeth  Definitive partials    Referral(s): [facility] for ext .  Rx: None.  Recommended recall schedule: 6 months.    Patient dismissed ambulatory and alert.    NV: eval for prelim impressions for mx/mnd interim RPD after ext of posterior if pt is ready to continue with entire process  NV: Prophy .    Attending: Dr. Jeffery examined pt.

## 2025-05-07 ENCOUNTER — TELEPHONE (OUTPATIENT)
Dept: DENTISTRY | Facility: CLINIC | Age: 65
End: 2025-05-07

## 2025-05-08 PROBLEM — Z00.00 MEDICARE ANNUAL WELLNESS VISIT, SUBSEQUENT: Status: RESOLVED | Noted: 2025-04-08 | Resolved: 2025-05-08

## 2025-05-10 DIAGNOSIS — F41.1 GENERALIZED ANXIETY DISORDER: ICD-10-CM

## 2025-05-12 RX ORDER — BUSPIRONE HYDROCHLORIDE 5 MG/1
5 TABLET ORAL 3 TIMES DAILY PRN
Qty: 60 TABLET | Refills: 1 | Status: SHIPPED | OUTPATIENT
Start: 2025-05-12 | End: 2025-05-20

## 2025-05-14 DIAGNOSIS — M54.40 BACK PAIN OF LUMBAR REGION WITH SCIATICA: ICD-10-CM

## 2025-05-15 RX ORDER — LIDOCAINE 40 MG/G
CREAM TOPICAL AS NEEDED
Qty: 30 G | Refills: 3 | Status: SHIPPED | OUTPATIENT
Start: 2025-05-15

## 2025-05-16 DIAGNOSIS — J06.9 VIRAL URI: ICD-10-CM

## 2025-05-16 RX ORDER — ALBUTEROL SULFATE 90 UG/1
2 INHALANT RESPIRATORY (INHALATION) EVERY 6 HOURS PRN
Qty: 8.5 G | Refills: 1 | Status: SHIPPED | OUTPATIENT
Start: 2025-05-16

## 2025-05-19 NOTE — ASSESSMENT & PLAN NOTE
Chronic leukocytosis with mild elevation of lymphocytes    Likely reactive in setting of URI when blood work drawn  Consider repeat if any B symptoms develop or simply to recheck at patient's earliest convenience   Not unreasonable to wait until next physical exam  No B symptoms  Lung nodules stable and Lung CT UTD for screening  If remains elevated past baseline on recheck, consider SPEP, UPEP, cytology and referral to heme-onc for bm bx

## 2025-05-19 NOTE — PROGRESS NOTES
Name: Ingrid Sanches      : 1960      MRN: [id number]  Encounter Provider: Raquel oCuch DO  Encounter Date: 2025   Encounter department: [facility]    Assessment & Plan  Primary hypertension  Compounded by smoking use disorder and caffeine use disorder  continue current regimen: lisinopril 20mg daily   Pt stopped amlodipine as she was scared of taking multiple antihtn at once   Holding off on restarting amlodipine as pt would like to try lifestyle modifications first  continue smoking cessation/abstinence  Continue caffeine moderation  reccommended low salt diet (<2g per day)  counseled on continuing healthy lifestyle modifications             Cerebral aneurysm, 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  2021 complaining of pulsatile tinnitus, MRA ordered and demonstrated  CTA yearly, scheduled , repeat   Counseled on smoking cessation, see below         Type 2 diabetes mellitus without complication, without long-term current use of insulin (HCC)    Lab Results   Component Value Date    HGBA1C 6.3 2025     Currently at goal for A1c (<7%, <8% if >80)  Continue current regimen of diet control  Continue Ace-i/arb  IS on statin, continue/consider adding            repeat lipids q3yrs, due   IS following optho, next eye exam due asap considering ungradeable L eye image on IRIS  intermittently following podiatry, next foot exam due   yearly micro albumin creatinine ratio is UTD, due   Carb controlled diet, discussed healthy lifestyle modifications    Complications: - CKD, ? L retinopathy, - neuropathy, - erectile dysfunction, - PAD      Orders:    nicotine polacrilex (NICORETTE) 2 mg gum; Chew 1 each (2 mg total) as needed for smoking cessation    Ambulatory Referral to Ophthalmology; Future    Leukemoid reaction  Chronic leukocytosis with mild elevation of lymphocytes    Likely reactive in setting of URI when blood work  drawn  Consider repeat if any B symptoms develop or simply to recheck at patient's earliest convenience   Not unreasonable to wait until next physical exam  No B symptoms  Lung nodules stable and Lung CT UTD for screening  If remains elevated past baseline on recheck, consider SPEP, UPEP, cytology and referral to heme-onc for bm bx           Tobacco abuse disorder  Tobacco use is a significant patient-modifiable risk factor for this patient’s vascular disease with multiple vascular comorbidities, and a significant risk factor for failure of and complications from any endovascular or surgical interventions.    I explained to the patient the effects of smoking including peripheral artery disease, coronary artery disease, cerebrovascular disease as well as cancer and chronic obstructive pulmonary disease. I asked the patient to stop smoking immediately. It is never too late to quit, and many studies show significant health benefits as well as economical savings after smoking cessation. I offered to the patient nicotine replacement therapy as well as referral to the smoking cessation program and access to the quit line 7-502-OETYWVE or ambulatory referral to our network smoking cessation program.    Based on our conversation, this patient appears motivated to quit  And plans to use prescription medication to help quit    The patient set a quit date of 04/09 and did NOT adhere to it; new quit date 6/29/25. I will continue to follow up on this issue at our next scheduled visit.   Discussed various ways to quit including, NRT patches, lozenges, gum; chantix, wellbutrin   Discussed side effects, risks, and benefits, which patient able to teach back. ER precautions given   Patient elected to use chantix for meds   Added nicotine gum for cravings and for hopeful compliance of complete cessation  Given number for PA QUITS     I spent approximately 11 minutes on tobacco cessation counseling with this patient.    Lung cancer  screening IS UTD, due 2026           Generalized anxiety disorder  Patient tends to have increased anxiety since substantial decrease in cig use   Notes she tends to eat more and have gained 3 pounds in one month  Increase buspar to 10mg TID PRN from 5 mg TID PRN    Orders:    busPIRone (BUSPAR) 10 mg tablet; Take 1 tablet (10 mg total) by mouth 3 (three) times a day as needed (anxiety)         History of Present Illness     past medical history of type 2 diabetes controlled with diet, tobacco use disorder in remission, hypertension, HLD, non-ruptured cerebral anneurysm 2022 requiring yearly CTA head who presents today to review labs and imaging along with recheck smoking cessation progress    Reviewed at length:    DM eye exam Wnl on right however image for L eye not clear and thus unable to determine retinopathy presence or severity. Encouraged pt to see optho asap.     A1c stable, 6.3 (from 6.4, controlled). Vit D WNL, CMP generally WNL, slightly low cr, which is stable and reasonable for pt petite and thin body frame.     Continued leukocytosis, although expected since pt recently ill with URI and continues to smoke; if continued leukocytosis above baseline (12) once smoking cessation and resolution of URI consider SPEP UPEP and onc workup.     alb cr ratio much improved    CT lung screening Pulmonary nodules ( 3 mm right upper lobe, 3 mm right middle lobe, 4 mm subpleural right lower lobe) all stable    Mammo WNL      Last office visit: pt set quit date of 4/9 and started on Chantix    Pt reports compliance with chantix. She does have have cravings and does smoke about 2 cigs per day but does not finish completely; no increased depression or SI.           Review of Systems   Constitutional:  Negative for fever.   HENT:  Negative for congestion.    Eyes:  Negative for visual disturbance.   Respiratory:  Negative for cough and shortness of breath.    Cardiovascular:  Negative for chest pain and palpitations.    Gastrointestinal:  Negative for abdominal pain, constipation, diarrhea, nausea and vomiting.   Neurological:  Negative for dizziness, syncope, light-headedness and headaches.   Psychiatric/Behavioral:  Negative for dysphoric mood and sleep disturbance. The patient is not nervous/anxious.      Past Medical History:   Diagnosis Date    Diabetes mellitus (HCC)     Hyperlipidemia     Hypertension      Past Surgical History:   Procedure Laterality Date    BREAST BIOPSY Left 2021    stereo    BREAST EXCISIONAL BIOPSY Left     8 yrs ago benign in [place]    BREAST SURGERY  2016    removal of cyst      SECTION      COLONOSCOPY  2021    MAMMO STEREOTACTIC BREAST BIOPSY LEFT (ALL INC) Left 2021    TUBAL LIGATION      US GUIDED BREAST BIOPSY RIGHT COMPLETE Right 2021     Family History   Problem Relation Age of Onset    Diabetes Mother     Diabetes Sister     Diabetes Sister     Diabetes Sister     No Known Problems Sister     Endometrial cancer Sister 58    No Known Problems Daughter     No Known Problems Maternal Grandmother     No Known Problems Maternal Grandfather     Diabetes Paternal Grandmother     Cancer Paternal Grandmother     Endometrial cancer Maternal Aunt         unsure of age    Cancer Maternal Aunt     No Known Problems Maternal Aunt     No Known Problems Maternal Aunt     No Known Problems Maternal Aunt     No Known Problems Maternal Aunt     No Known Problems Paternal Aunt     No Known Problems Paternal Aunt     No Known Problems Paternal Aunt     No Known Problems Paternal Aunt     No Known Problems Paternal Aunt     No Known Problems Paternal Aunt     No Known Problems Paternal Aunt     No Known Problems Son     No Known Problems Son      Social History     Tobacco Use    Smoking status: Every Day     Current packs/day: 0.00     Types: Cigarettes     Last attempt to quit: 1981     Years since quittin.4    Smokeless tobacco: Never    Tobacco comments:     Smokes  about 4 cigarettes a day    Vaping Use    Vaping status: Never Used   Substance and Sexual Activity    Alcohol use: Not Currently     Comment: social    Drug use: Never    Sexual activity: Not Currently     Birth control/protection: None, Post-menopausal     Medications[1]  Allergies   Allergen Reactions    Nicoderm [Nicotine] Palpitations, Other (See Comments) and Chest Pain     Nicotine gum - nose bleeds     Immunization History   Administered Date(s) Administered    COVID-19 PFIZER VACCINE 0.3 ML IM 04/10/2021, 04/29/2021, 01/12/2022    Hep A, adult 10/28/2020    Hep B, adult 10/28/2020, 12/04/2020    Influenza, recombinant, quadrivalent,injectable, preservative free 09/23/2020, 03/01/2022, 01/24/2023    Pneumococcal Conjugate Vaccine 20-valent (Pcv20), Polysace 08/02/2022    Pneumococcal Polysaccharide PPV23 03/15/2021    Tdap 10/28/2020     Objective   There were no vitals taken for this visit.    Physical Exam  Constitutional:       General: She is not in acute distress.     Appearance: She is well-developed.   HENT:      Head: Normocephalic and atraumatic.     Eyes:      General: No scleral icterus.     Conjunctiva/sclera: Conjunctivae normal.     Neck:      Thyroid: No thyromegaly.     Cardiovascular:      Rate and Rhythm: Normal rate and regular rhythm.      Heart sounds: Normal heart sounds. No murmur heard.     No friction rub. No gallop.   Pulmonary:      Effort: Pulmonary effort is normal. No respiratory distress.      Breath sounds: Normal breath sounds. No stridor. No wheezing or rales.   Abdominal:      General: Bowel sounds are normal. There is no distension.      Palpations: Abdomen is soft. There is no mass.      Tenderness: There is no abdominal tenderness. There is no guarding or rebound.     Musculoskeletal:         General: No deformity.      Cervical back: Neck supple.      Right lower leg: No edema.      Left lower leg: No edema.     Skin:     General: Skin is warm.      Capillary Refill:  Capillary refill takes less than 2 seconds.      Findings: No erythema or rash.     Neurological:      Mental Status: She is alert and oriented to person, place, and time.      Motor: No weakness (5/5 b/l LE).     Psychiatric:         Mood and Affect: Mood normal.         Behavior: Behavior normal.         Thought Content: Thought content normal.         Judgment: Judgment normal.       Administrative Statements   I have spent a total time of 40 minutes in caring for this patient on the day of the visit/encounter including Diagnostic results, Prognosis, Risks and benefits of tx options, Instructions for management, Patient and family education, Importance of tx compliance, Risk factor reductions, Impressions, Counseling / Coordination of care, Documenting in the medical record, Reviewing/placing orders in the medical record (including tests, medications, and/or procedures), Obtaining or reviewing history  , and Communicating with other healthcare professionals .         [1]   Current Outpatient Medications on File Prior to Visit   Medication Sig    acetaminophen (TYLENOL) 500 mg tablet Take 500 mg by mouth every 6 (six) hours as needed for mild pain    albuterol (PROVENTIL HFA,VENTOLIN HFA) 90 mcg/act inhaler INHALE 2 PUFFS EVERY 6 (SIX) HOURS AS NEEDED FOR WHEEZING    amLODIPine (NORVASC) 5 mg tablet TAKE ONE TABLET BY MOUTH DAILY    busPIRone (BUSPAR) 5 mg tablet TAKE 1 TABLET (5 MG TOTAL) BY MOUTH 3 (THREE) TIMES A DAY AS NEEDED (ANXIETY)    cholecalciferol (VITAMIN D3) 1,000 units tablet TAKE 2 TABLETS BY MOUTH DAILY AS DIRECTED    Diclofenac Sodium (VOLTAREN) 1 % APPLY 4 G TOPICALLY 4 (FOUR) TIMES A DAY    docusate sodium (COLACE) 250 MG capsule Take 1 capsule (250 mg total) by mouth 2 (two) times a day    guaiFENesin (ROBITUSSIN) 100 MG/5ML oral liquid Take 10 mL (200 mg total) by mouth 3 (three) times a day as needed for cough    lidocaine (LMX) 4 % cream APPLY TOPICALLY AS NEEDED FOR MILD PAIN    lisinopril  (ZESTRIL) 20 mg tablet TAKE ONE TABLET BY MOUTH DAILY AS DIRECTED    rosuvastatin (CRESTOR) 40 MG tablet TAKE ONE TABLET BY MOUTH DAILY AS DIRECTED    urea (CARMOL) 40 % Apply topically daily Apply topically daily to affected areas on bilateral plantar foot.    Varenicline Tartrate, Starter, (Chantix Starting Month Waldemar) 0.5 MG X 11 & 1 MG X 42 TBPK 0.5 mg once daily for 3 days then 0.5mg twice daily for days 4-7 then 1 mg twice daily

## 2025-05-19 NOTE — ASSESSMENT & PLAN NOTE
Tobacco use is a significant patient-modifiable risk factor for this patient’s vascular disease with multiple vascular comorbidities, and a significant risk factor for failure of and complications from any endovascular or surgical interventions.    I explained to the patient the effects of smoking including peripheral artery disease, coronary artery disease, cerebrovascular disease as well as cancer and chronic obstructive pulmonary disease. I asked the patient to stop smoking immediately. It is never too late to quit, and many studies show significant health benefits as well as economical savings after smoking cessation. I offered to the patient nicotine replacement therapy as well as referral to the smoking cessation program and access to the quit line 7-343-KABCLJJ or ambulatory referral to our network smoking cessation program.    Based on our conversation, this patient appears motivated to quit  And plans to use prescription medication to help quit    The patient set a quit date of 04/09 and did NOT adhere to it; new quit date 6/29/25. I will continue to follow up on this issue at our next scheduled visit.   Discussed various ways to quit including, NRT patches, lozenges, gum; chantix, wellbutrin   Discussed side effects, risks, and benefits, which patient able to teach back. ER precautions given   Patient elected to use chantix for meds   Added nicotine gum for cravings and for hopeful compliance of complete cessation  Given number for PA QUITS     I spent approximately 11 minutes on tobacco cessation counseling with this patient.    Lung cancer screening IS UTD, due 2026

## 2025-05-19 NOTE — ASSESSMENT & PLAN NOTE
Lab Results   Component Value Date    HGBA1C 6.3 04/08/2025     Currently at goal for A1c (<7%, <8% if >80)  Continue current regimen of diet control  Continue Ace-i/arb  IS on statin, continue/consider adding            repeat lipids q3yrs, due 2025  IS following optho, next eye exam due asap considering ungradeable L eye image on IRIS  intermittently following podiatry, next foot exam due 2026  yearly micro albumin creatinine ratio is UTD, due 2026  Carb controlled diet, discussed healthy lifestyle modifications    Complications: - CKD, ? L retinopathy, - neuropathy, - erectile dysfunction, - PAD      Orders:    nicotine polacrilex (NICORETTE) 2 mg gum; Chew 1 each (2 mg total) as needed for smoking cessation    Ambulatory Referral to Ophthalmology; Future

## 2025-05-20 ENCOUNTER — OFFICE VISIT (OUTPATIENT)
Dept: INTERNAL MEDICINE CLINIC | Facility: CLINIC | Age: 65
End: 2025-05-20

## 2025-05-20 VITALS
TEMPERATURE: 97.8 F | HEART RATE: 68 BPM | SYSTOLIC BLOOD PRESSURE: 114 MMHG | BODY MASS INDEX: 22.38 KG/M2 | DIASTOLIC BLOOD PRESSURE: 68 MMHG | WEIGHT: 130.4 LBS

## 2025-05-20 DIAGNOSIS — I67.1 CEREBRAL ANEURYSM, NONRUPTURED: ICD-10-CM

## 2025-05-20 DIAGNOSIS — E11.9 TYPE 2 DIABETES MELLITUS WITHOUT COMPLICATION, WITHOUT LONG-TERM CURRENT USE OF INSULIN (HCC): ICD-10-CM

## 2025-05-20 DIAGNOSIS — D72.823 LEUKEMOID REACTION: ICD-10-CM

## 2025-05-20 DIAGNOSIS — Z72.0 TOBACCO ABUSE DISORDER: Primary | ICD-10-CM

## 2025-05-20 DIAGNOSIS — F41.1 GENERALIZED ANXIETY DISORDER: ICD-10-CM

## 2025-05-20 DIAGNOSIS — I10 PRIMARY HYPERTENSION: ICD-10-CM

## 2025-05-20 PROCEDURE — G2211 COMPLEX E/M VISIT ADD ON: HCPCS | Performed by: STUDENT IN AN ORGANIZED HEALTH CARE EDUCATION/TRAINING PROGRAM

## 2025-05-20 PROCEDURE — 3078F DIAST BP <80 MM HG: CPT | Performed by: STUDENT IN AN ORGANIZED HEALTH CARE EDUCATION/TRAINING PROGRAM

## 2025-05-20 PROCEDURE — 3074F SYST BP LT 130 MM HG: CPT | Performed by: STUDENT IN AN ORGANIZED HEALTH CARE EDUCATION/TRAINING PROGRAM

## 2025-05-20 PROCEDURE — 99214 OFFICE O/P EST MOD 30 MIN: CPT | Performed by: STUDENT IN AN ORGANIZED HEALTH CARE EDUCATION/TRAINING PROGRAM

## 2025-05-20 RX ORDER — BUSPIRONE HYDROCHLORIDE 10 MG/1
10 TABLET ORAL 3 TIMES DAILY PRN
Qty: 30 TABLET | Refills: 1 | Status: SHIPPED | OUTPATIENT
Start: 2025-05-20

## 2025-05-20 NOTE — ASSESSMENT & PLAN NOTE
Patient tends to have increased anxiety since substantial decrease in cig use   Notes she tends to eat more and have gained 3 pounds in one month  Increase buspar to 10mg TID PRN from 5 mg TID PRN    Orders:    busPIRone (BUSPAR) 10 mg tablet; Take 1 tablet (10 mg total) by mouth 3 (three) times a day as needed (anxiety)

## 2025-06-03 ENCOUNTER — OFFICE VISIT (OUTPATIENT)
Dept: DENTISTRY | Facility: CLINIC | Age: 65
End: 2025-06-03

## 2025-06-03 VITALS — SYSTOLIC BLOOD PRESSURE: 135 MMHG | DIASTOLIC BLOOD PRESSURE: 75 MMHG

## 2025-06-03 DIAGNOSIS — Z01.21 ENCOUNTER FOR DENTAL EXAMINATION AND CLEANING WITH ABNORMAL FINDINGS: Primary | ICD-10-CM

## 2025-06-03 DIAGNOSIS — K05.4 PERIODONTOSIS: ICD-10-CM

## 2025-06-03 PROCEDURE — D1110 PROPHYLAXIS - ADULT: HCPCS

## 2025-06-03 NOTE — PROGRESS NOTES
Procedure Details   - PROPHYLAXIS - ADULT    Pt arrived at [facility] for cleaning apt.   Jamaican translation used #[id number]  Reviewed Medical History-still smokes sometimes, diabeted, hbp.  ASA:II  Chief Complaint:has apt. Soon to get extractions      Adult Prophy (could not charge as perio maint as it was not previously trt planned)   Last cleaning 2023  Intraoral exam:sloughing tissue right buccal mucosa  Oral Hygiene: moderate plaque, light calculus, light bleeding, heavy stain  Perio.:spot probed anteriors-general. 2-3mm.   Discussed with DR JENNIFER anderson plan/perio. Since she is extracting mobile posteriors with perio involvement and anteriors are 2-3mm, keep on 4 mos perio maint to pc and keep close monitor on  Hand & ultrasonic scaled, Flossed, polished  Patient tolerated well      NV:exts  Needs:4 mos perio maint/ PC 10/25, take pa's   Per exam due 11/2025  Bws pas last taken 8/3023  Crowns, partials      Mari Alcaraz RDH., PHDHP.

## 2025-06-03 NOTE — DENTAL PROCEDURE DETAILS
Pt arrived at [facility] for cleaning apt.   Greenlandic translation used #[id number]  Reviewed Medical History-still smokes sometimes, diabeted, hbp.  ASA:II  Chief Complaint:has apt. Soon to get extractions      Adult Prophy (could not charge as perio maint as it was not previously trt planned)   Last cleaning 2023  Intraoral exam:sloughing tissue right buccal mucosa  Oral Hygiene: moderate plaque, light calculus, light bleeding, heavy stain  Perio.:spot probed anteriors-general. 2-3mm.   Discussed with DR JENNIFER anderson plan/perio. Since she is extracting mobile posteriors with perio involvement and anteriors are 2-3mm, keep on 4 mos perio maint to pc and keep close monitor on  Hand & ultrasonic scaled, Flossed, polished  Patient tolerated well      NV:exts  Needs:4 mos perio maint/ PC 10/25, take pa's   Per exam due 11/2025  Bws pas last taken 8/3023  Crowns, partials      Mari Alcaraz RDH., PHDHP.

## 2025-06-04 DIAGNOSIS — G89.29 CHRONIC LEFT SHOULDER PAIN: ICD-10-CM

## 2025-06-04 DIAGNOSIS — M25.512 CHRONIC LEFT SHOULDER PAIN: ICD-10-CM

## 2025-06-16 DIAGNOSIS — E11.9 TYPE 2 DIABETES MELLITUS WITHOUT COMPLICATION, WITHOUT LONG-TERM CURRENT USE OF INSULIN (HCC): ICD-10-CM

## 2025-07-01 ENCOUNTER — OFFICE VISIT (OUTPATIENT)
Dept: INTERNAL MEDICINE CLINIC | Facility: CLINIC | Age: 65
End: 2025-07-01

## 2025-07-01 VITALS
SYSTOLIC BLOOD PRESSURE: 106 MMHG | HEART RATE: 78 BPM | DIASTOLIC BLOOD PRESSURE: 68 MMHG | HEIGHT: 64 IN | OXYGEN SATURATION: 98 % | TEMPERATURE: 98 F | BODY MASS INDEX: 22.36 KG/M2 | WEIGHT: 131 LBS | RESPIRATION RATE: 16 BRPM

## 2025-07-01 DIAGNOSIS — Z72.0 TOBACCO ABUSE DISORDER: ICD-10-CM

## 2025-07-01 DIAGNOSIS — I67.1 CEREBRAL ANEURYSM, NONRUPTURED: ICD-10-CM

## 2025-07-01 DIAGNOSIS — M54.40 BACK PAIN OF LUMBAR REGION WITH SCIATICA: ICD-10-CM

## 2025-07-01 DIAGNOSIS — F17.210 CIGARETTE NICOTINE DEPENDENCE WITHOUT COMPLICATION: ICD-10-CM

## 2025-07-01 DIAGNOSIS — E11.9 TYPE 2 DIABETES MELLITUS WITHOUT COMPLICATION, WITHOUT LONG-TERM CURRENT USE OF INSULIN (HCC): ICD-10-CM

## 2025-07-01 DIAGNOSIS — F41.1 GENERALIZED ANXIETY DISORDER: ICD-10-CM

## 2025-07-01 DIAGNOSIS — I10 PRIMARY HYPERTENSION: Primary | ICD-10-CM

## 2025-07-01 RX ORDER — LIDOCAINE 40 MG/G
CREAM TOPICAL AS NEEDED
Qty: 30 G | Refills: 3 | Status: SHIPPED | OUTPATIENT
Start: 2025-07-01

## 2025-07-01 RX ORDER — VARENICLINE TARTRATE 1 MG/1
1 TABLET, FILM COATED ORAL 2 TIMES DAILY
Qty: 60 TABLET | Refills: 1 | Status: SHIPPED | OUTPATIENT
Start: 2025-07-01

## 2025-07-01 RX ORDER — BUSPIRONE HYDROCHLORIDE 10 MG/1
10 TABLET ORAL 3 TIMES DAILY PRN
Qty: 30 TABLET | Refills: 1 | Status: SHIPPED | OUTPATIENT
Start: 2025-07-01

## 2025-07-01 NOTE — ASSESSMENT & PLAN NOTE
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  11/24/2021 complaining of pulsatile tinnitus, MRA ordered and demonstrated  CTA yearly, scheduled 2/13/2026  Counseled on smoking cessation, see below

## 2025-07-01 NOTE — PROGRESS NOTES
Name: Ingrid Sanches      : 1960      MRN: [id number]  Encounter Provider: Raquel Couch DO  Encounter Date: 2025   Encounter department: [facility]    Assessment & Plan  Primary hypertension  Compounded by smoking use disorder and caffeine use disorder  continue current regimen: lisinopril 20mg daily   Pt stopped amlodipine as she was scared of taking multiple antihtn at once   Holding off on restarting amlodipine as pt would like to try lifestyle modifications first  Strongly encouraged smoking cessation/abstinence  Continue caffeine moderation  reccommended low salt diet (<2g per day)  counseled on continuing healthy lifestyle modifications           Cerebral aneurysm, 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  2021 complaining of pulsatile tinnitus, MRA ordered and demonstrated  CTA yearly, scheduled 2026  Counseled on smoking cessation, see below       Tobacco abuse disorder  Tobacco use is a significant patient-modifiable risk factor for this patient’s vascular disease with multiple vascular comorbidities, and a significant risk factor for failure of and complications from any endovascular or surgical interventions.    I explained to the patient the effects of smoking including peripheral artery disease, coronary artery disease, cerebrovascular disease as well as cancer and chronic obstructive pulmonary disease. I asked the patient to stop smoking immediately. It is never too late to quit, and many studies show significant health benefits as well as economical savings after smoking cessation. I offered to the patient nicotine replacement therapy as well as referral to the smoking cessation program and access to the quit line 3-964-AZHGYXE or ambulatory referral to our network smoking cessation program.    Based on our conversation, this patient appears motivated to quit  And plans to use prescription medication to help quit    The patient set a  quit date of 6/29/25, but again, failed to adhere to date. I will continue to follow up on this issue at our next scheduled visit.   Discussed various ways to quit including, NRT patches, lozenges, gum; chantix, wellbutrin   Discussed side effects, risks, and benefits, which patient able to teach back. ER precautions given   Patient elected to use chantix for meds   Added nicotine gum for cravings and for hopeful compliance of complete cessation  Given number for PA QUITS     I spent approximately 11 minutes on tobacco cessation counseling with this patient.    Lung cancer screening IS UTD, due 2026         Type 2 diabetes mellitus without complication, without long-term current use of insulin (HCC)    Lab Results   Component Value Date    HGBA1C 6.3 04/08/2025     Currently at goal for A1c (<7%, <8% if >80)  Continue current regimen of diet control  Continue Ace-i/arb  IS on statin, continue/consider adding            repeat lipids q3yrs, due 2025  IS following optho, next eye exam due asap considering ungradeable L eye image on [facility]  intermittently following podiatry, next foot exam due 2026  yearly micro albumin creatinine ratio is UTD, due 2026  Carb controlled diet, discussed healthy lifestyle modifications    Complications: - CKD, ? L retinopathy, - neuropathy, - erectile dysfunction, - PAD    Orders:    [facility] Diabetic eye exam    Generalized anxiety disorder  Improved with increase of buspar dose, continue  Orders:    busPIRone (BUSPAR) 10 mg tablet; Take 1 tablet (10 mg total) by mouth 3 (three) times a day as needed (anxiety)    Cigarette nicotine dependence without complication    Orders:    varenicline (Chantix Continuing Month Pak) 1 mg tablet; Take 1 tablet (1 mg total) by mouth 2 (two) times a day    Back pain of lumbar region with sciatica    Orders:    lidocaine (LMX) 4 % cream; Apply topically as needed for mild pain         History of Present Illness     past medical history of type 2 diabetes  controlled with diet, tobacco use disorder in remission, hypertension, HLD, non-ruptured cerebral anneurysm 2022 requiring yearly CTA head who presents for tobacco cessation recheck    Last office visit:  set a quit date of 04/09 and did NOT adhere to it; new quit date 6/29/25. Patient elected to use chantix for meds. Added nicotine gum for cravings and for hopeful compliance of complete cessation. In regards to her anxiety, Increased buspar to 10mg TID PRN from 5 mg TID PRN      Today, patient reports she is still smoking cigarettes, about 2 per day. She is compliant with Chantix and reports mild nausea as side effects. She has not been using the gum.    Patient has been using buspar 10mg TID with large improvement/control of anxiety symptoms.           Review of Systems   Constitutional:  Negative for fever.   HENT:  Negative for congestion.    Eyes:  Negative for visual disturbance.   Respiratory:  Negative for cough and shortness of breath.    Cardiovascular:  Negative for chest pain and palpitations.   Gastrointestinal:  Negative for abdominal pain, constipation, diarrhea, nausea and vomiting.   Neurological:  Negative for dizziness, syncope, light-headedness and headaches.   Psychiatric/Behavioral:  Negative for dysphoric mood and sleep disturbance. The patient is not nervous/anxious.      Past Medical History[1]  Past Surgical History[2]  Family History[3]  Social History[4]  Medications[5]  Allergies   Allergen Reactions    Nicoderm [Nicotine] Palpitations, Other (See Comments) and Chest Pain     Nicotine gum - nose bleeds     Immunization History   Administered Date(s) Administered    COVID-19 PFIZER VACCINE 0.3 ML IM 04/10/2021, 04/29/2021, 01/12/2022    Hep A, adult 10/28/2020    Hep B, adult 10/28/2020, 12/04/2020    Influenza, recombinant, quadrivalent,injectable, preservative free 09/23/2020, 03/01/2022, 01/24/2023    Pneumococcal Conjugate Vaccine 20-valent (Pcv20), Polysace 08/02/2022    Pneumococcal  "Polysaccharide PPV23 03/15/2021    Tdap 10/28/2020     Objective   /68 (BP Location: Right arm, Patient Position: Sitting, Cuff Size: Standard)   Pulse 78   Temp 98 °F (36.7 °C) (Temporal)   Resp 16   Ht 5' 4\" (1.626 m)   Wt 59.4 kg (131 lb)   SpO2 98%   BMI 22.49 kg/m²     Physical Exam  Constitutional:       General: She is not in acute distress.     Appearance: She is well-developed.   HENT:      Head: Normocephalic and atraumatic.     Eyes:      General: No scleral icterus.     Conjunctiva/sclera: Conjunctivae normal.     Neck:      Thyroid: No thyromegaly.     Cardiovascular:      Rate and Rhythm: Normal rate and regular rhythm.      Heart sounds: Normal heart sounds. No murmur heard.     No friction rub. No gallop.   Pulmonary:      Effort: Pulmonary effort is normal. No respiratory distress.      Breath sounds: Normal breath sounds. No stridor. No wheezing or rales.   Abdominal:      General: Bowel sounds are normal. There is no distension.      Palpations: Abdomen is soft. There is no mass.      Tenderness: There is no abdominal tenderness. There is no guarding or rebound.     Musculoskeletal:         General: No deformity.      Cervical back: Neck supple.      Right lower leg: No edema.      Left lower leg: No edema.     Skin:     General: Skin is warm.      Capillary Refill: Capillary refill takes less than 2 seconds.      Findings: No erythema or rash.     Neurological:      Mental Status: She is alert and oriented to person, place, and time.      Motor: No weakness (5/5 b/l LE).     Psychiatric:         Mood and Affect: Mood normal.         Behavior: Behavior normal.         Thought Content: Thought content normal.         Judgment: Judgment normal.         I have spent a total time of 40 minutes in caring for this patient on the day of the visit/encounter including Diagnostic results, Prognosis, Risks and benefits of tx options, Instructions for management, Patient and family education, " Importance of tx compliance, Risk factor reductions, Impressions, Counseling / Coordination of care, Documenting in the medical record, Reviewing/placing orders in the medical record (including tests, medications, and/or procedures), Obtaining or reviewing history  , and Communicating with other healthcare professionals          [1]   Past Medical History:  Diagnosis Date    Diabetes mellitus (HCC)     Hyperlipidemia     Hypertension    [2]   Past Surgical History:  Procedure Laterality Date    BREAST BIOPSY Left 2021    stereo    BREAST EXCISIONAL BIOPSY Left     8 yrs ago benign in [place]    BREAST SURGERY  2016    removal of cyst      SECTION      COLONOSCOPY  2021    MAMMO STEREOTACTIC BREAST BIOPSY LEFT (ALL INC) Left 2021    TUBAL LIGATION      US GUIDED BREAST BIOPSY RIGHT COMPLETE Right 2021   [3]   Family History  Problem Relation Name Age of Onset    Diabetes Mother      Diabetes Sister      Diabetes Sister      Diabetes Sister      No Known Problems Sister      Endometrial cancer Sister  58    No Known Problems Daughter  at birth     No Known Problems Maternal Grandmother      No Known Problems Maternal Grandfather      Diabetes Paternal Grandmother      Cancer Paternal Grandmother      Endometrial cancer Maternal Aunt          unsure of age    Cancer Maternal Aunt      No Known Problems Maternal Aunt      No Known Problems Maternal Aunt      No Known Problems Maternal Aunt      No Known Problems Maternal Aunt      No Known Problems Paternal Aunt      No Known Problems Paternal Aunt      No Known Problems Paternal Aunt      No Known Problems Paternal Aunt      No Known Problems Paternal Aunt      No Known Problems Paternal Aunt      No Known Problems Paternal Aunt      No Known Problems Son      No Known Problems Son     [4]   Social History  Tobacco Use    Smoking status: Some Days     Current packs/day: 0.00     Types: Cigarettes     Last attempt to quit:       Years since quittin.5    Smokeless tobacco: Never    Tobacco comments:     Smokes about 4 cigarettes a day    Vaping Use    Vaping status: Never Used   Substance and Sexual Activity    Alcohol use: Not Currently     Comment: social    Drug use: Never    Sexual activity: Not Currently     Birth control/protection: None, Post-menopausal   [5]   Current Outpatient Medications on File Prior to Visit   Medication Sig    acetaminophen (TYLENOL) 500 mg tablet Take 500 mg by mouth every 6 (six) hours as needed for mild pain    albuterol (PROVENTIL HFA,VENTOLIN HFA) 90 mcg/act inhaler INHALE 2 PUFFS EVERY 6 (SIX) HOURS AS NEEDED FOR WHEEZING (Patient not taking: Reported on 6/3/2025)    amLODIPine (NORVASC) 5 mg tablet TAKE ONE TABLET BY MOUTH DAILY    cholecalciferol (VITAMIN D3) 1,000 units tablet TAKE 2 TABLETS BY MOUTH DAILY AS DIRECTED    Diclofenac Sodium (VOLTAREN) 1 % APPLY 4 G TOPICALLY 4 (FOUR) TIMES A DAY    docusate sodium (COLACE) 250 MG capsule Take 1 capsule (250 mg total) by mouth 2 (two) times a day    guaiFENesin (ROBITUSSIN) 100 MG/5ML oral liquid Take 10 mL (200 mg total) by mouth 3 (three) times a day as needed for cough    lisinopril (ZESTRIL) 20 mg tablet TAKE ONE TABLET BY MOUTH DAILY AS DIRECTED    nicotine polacrilex (NICORETTE) 2 mg gum CHEW 1 EACH (2 MG TOTAL) AS NEEDED FOR SMOKING CESSATION    rosuvastatin (CRESTOR) 40 MG tablet TAKE ONE TABLET BY MOUTH DAILY AS DIRECTED    urea (CARMOL) 40 % Apply topically daily Apply topically daily to affected areas on bilateral plantar foot.

## 2025-07-01 NOTE — ASSESSMENT & PLAN NOTE
Improved with increase of buspar dose, continue  Orders:    busPIRone (BUSPAR) 10 mg tablet; Take 1 tablet (10 mg total) by mouth 3 (three) times a day as needed (anxiety)

## 2025-07-01 NOTE — ASSESSMENT & PLAN NOTE
Lab Results   Component Value Date    HGBA1C 6.3 04/08/2025     Currently at goal for A1c (<7%, <8% if >80)  Continue current regimen of diet control  Continue Ace-i/arb  IS on statin, continue/consider adding            repeat lipids q3yrs, due 2025  IS following optho, next eye exam due asap considering ungradeable L eye image on [facility]  intermittently following podiatry, next foot exam due 2026  yearly micro albumin creatinine ratio is UTD, due 2026  Carb controlled diet, discussed healthy lifestyle modifications    Complications: - CKD, ? L retinopathy, - neuropathy, - erectile dysfunction, - PAD    Orders:    [facility] Diabetic eye exam

## 2025-07-01 NOTE — ASSESSMENT & PLAN NOTE
Compounded by smoking use disorder and caffeine use disorder  continue current regimen: lisinopril 20mg daily   Pt stopped amlodipine as she was scared of taking multiple antihtn at once   Holding off on restarting amlodipine as pt would like to try lifestyle modifications first  Strongly encouraged smoking cessation/abstinence  Continue caffeine moderation  reccommended low salt diet (<2g per day)  counseled on continuing healthy lifestyle modifications

## 2025-07-01 NOTE — ASSESSMENT & PLAN NOTE
Tobacco use is a significant patient-modifiable risk factor for this patient’s vascular disease with multiple vascular comorbidities, and a significant risk factor for failure of and complications from any endovascular or surgical interventions.    I explained to the patient the effects of smoking including peripheral artery disease, coronary artery disease, cerebrovascular disease as well as cancer and chronic obstructive pulmonary disease. I asked the patient to stop smoking immediately. It is never too late to quit, and many studies show significant health benefits as well as economical savings after smoking cessation. I offered to the patient nicotine replacement therapy as well as referral to the smoking cessation program and access to the quit line 8-433-KOFYCZO or ambulatory referral to our network smoking cessation program.    Based on our conversation, this patient appears motivated to quit  And plans to use prescription medication to help quit    The patient set a quit date of 6/29/25, but again, failed to adhere to date. I will continue to follow up on this issue at our next scheduled visit.   Discussed various ways to quit including, NRT patches, lozenges, gum; chantix, wellbutrin   Discussed side effects, risks, and benefits, which patient able to teach back. ER precautions given   Patient elected to use chantix for meds   Added nicotine gum for cravings and for hopeful compliance of complete cessation  Given number for PA QUITS     I spent approximately 11 minutes on tobacco cessation counseling with this patient.    Lung cancer screening IS UTD, due 2026

## 2025-07-07 DIAGNOSIS — J06.9 VIRAL URI: ICD-10-CM

## 2025-07-07 RX ORDER — ALBUTEROL SULFATE 90 UG/1
2 INHALANT RESPIRATORY (INHALATION) EVERY 6 HOURS PRN
Qty: 8.5 G | Refills: 1 | Status: SHIPPED | OUTPATIENT
Start: 2025-07-07

## 2025-07-09 DIAGNOSIS — G89.29 CHRONIC LEFT SHOULDER PAIN: ICD-10-CM

## 2025-07-09 DIAGNOSIS — M25.512 CHRONIC LEFT SHOULDER PAIN: ICD-10-CM

## 2025-07-12 DIAGNOSIS — E11.9 TYPE 2 DIABETES MELLITUS WITHOUT COMPLICATION, WITHOUT LONG-TERM CURRENT USE OF INSULIN (HCC): ICD-10-CM

## 2025-07-16 DIAGNOSIS — F41.1 GENERALIZED ANXIETY DISORDER: ICD-10-CM

## 2025-07-16 RX ORDER — BUSPIRONE HYDROCHLORIDE 10 MG/1
10 TABLET ORAL 3 TIMES DAILY PRN
Qty: 30 TABLET | Refills: 1 | Status: SHIPPED | OUTPATIENT
Start: 2025-07-16

## 2025-07-29 ENCOUNTER — TELEPHONE (OUTPATIENT)
Dept: INTERNAL MEDICINE CLINIC | Facility: CLINIC | Age: 65
End: 2025-07-29

## 2025-08-14 ENCOUNTER — OFFICE VISIT (OUTPATIENT)
Dept: INTERNAL MEDICINE CLINIC | Facility: CLINIC | Age: 65
End: 2025-08-14

## 2025-08-14 ENCOUNTER — PATIENT OUTREACH (OUTPATIENT)
Dept: INTERNAL MEDICINE CLINIC | Facility: CLINIC | Age: 65
End: 2025-08-14

## 2025-08-15 ENCOUNTER — PATIENT OUTREACH (OUTPATIENT)
Dept: INTERNAL MEDICINE CLINIC | Facility: CLINIC | Age: 65
End: 2025-08-15

## 2025-08-18 ENCOUNTER — TELEPHONE (OUTPATIENT)
Dept: INTERNAL MEDICINE CLINIC | Facility: CLINIC | Age: 65
End: 2025-08-18

## 2025-08-21 ENCOUNTER — PATIENT OUTREACH (OUTPATIENT)
Dept: INTERNAL MEDICINE CLINIC | Facility: CLINIC | Age: 65
End: 2025-08-21